# Patient Record
Sex: FEMALE | Race: WHITE | NOT HISPANIC OR LATINO | URBAN - METROPOLITAN AREA
[De-identification: names, ages, dates, MRNs, and addresses within clinical notes are randomized per-mention and may not be internally consistent; named-entity substitution may affect disease eponyms.]

---

## 2020-01-23 ENCOUNTER — EMERGENCY (EMERGENCY)
Facility: HOSPITAL | Age: 85
LOS: 1 days | Discharge: HOME | End: 2020-01-23
Attending: EMERGENCY MEDICINE
Payer: MEDICARE

## 2020-01-23 VITALS
TEMPERATURE: 98 F | SYSTOLIC BLOOD PRESSURE: 177 MMHG | DIASTOLIC BLOOD PRESSURE: 78 MMHG | OXYGEN SATURATION: 96 % | HEART RATE: 80 BPM | RESPIRATION RATE: 18 BRPM

## 2020-01-23 VITALS
HEART RATE: 79 BPM | DIASTOLIC BLOOD PRESSURE: 77 MMHG | TEMPERATURE: 97 F | RESPIRATION RATE: 17 BRPM | OXYGEN SATURATION: 99 % | WEIGHT: 138.01 LBS | HEIGHT: 62 IN | SYSTOLIC BLOOD PRESSURE: 204 MMHG

## 2020-01-23 LAB
ALBUMIN SERPL ELPH-MCNC: 4.1 G/DL — SIGNIFICANT CHANGE UP (ref 3.5–5)
ALP SERPL-CCNC: 108 U/L — SIGNIFICANT CHANGE UP (ref 40–120)
ALT FLD-CCNC: 23 U/L DA — SIGNIFICANT CHANGE UP (ref 10–60)
ANION GAP SERPL CALC-SCNC: 6 MMOL/L — SIGNIFICANT CHANGE UP (ref 5–17)
APPEARANCE UR: CLEAR — SIGNIFICANT CHANGE UP
AST SERPL-CCNC: 24 U/L — SIGNIFICANT CHANGE UP (ref 10–40)
BACTERIA # UR AUTO: ABNORMAL /HPF
BASOPHILS # BLD AUTO: 0.03 K/UL — SIGNIFICANT CHANGE UP (ref 0–0.2)
BASOPHILS NFR BLD AUTO: 0.4 % — SIGNIFICANT CHANGE UP (ref 0–2)
BILIRUB SERPL-MCNC: 0.7 MG/DL — SIGNIFICANT CHANGE UP (ref 0.2–1.2)
BILIRUB UR-MCNC: NEGATIVE — SIGNIFICANT CHANGE UP
BUN SERPL-MCNC: 13 MG/DL — SIGNIFICANT CHANGE UP (ref 7–18)
CALCIUM SERPL-MCNC: 9.4 MG/DL — SIGNIFICANT CHANGE UP (ref 8.4–10.5)
CHLORIDE SERPL-SCNC: 101 MMOL/L — SIGNIFICANT CHANGE UP (ref 96–108)
CO2 SERPL-SCNC: 28 MMOL/L — SIGNIFICANT CHANGE UP (ref 22–31)
COLOR SPEC: YELLOW — SIGNIFICANT CHANGE UP
CREAT SERPL-MCNC: 0.7 MG/DL — SIGNIFICANT CHANGE UP (ref 0.5–1.3)
DIFF PNL FLD: NEGATIVE — SIGNIFICANT CHANGE UP
EOSINOPHIL # BLD AUTO: 0.02 K/UL — SIGNIFICANT CHANGE UP (ref 0–0.5)
EOSINOPHIL NFR BLD AUTO: 0.2 % — SIGNIFICANT CHANGE UP (ref 0–6)
EPI CELLS # UR: ABNORMAL /HPF
GLUCOSE SERPL-MCNC: 110 MG/DL — HIGH (ref 70–99)
GLUCOSE UR QL: NEGATIVE — SIGNIFICANT CHANGE UP
HCT VFR BLD CALC: 41.7 % — SIGNIFICANT CHANGE UP (ref 34.5–45)
HGB BLD-MCNC: 13.7 G/DL — SIGNIFICANT CHANGE UP (ref 11.5–15.5)
IMM GRANULOCYTES NFR BLD AUTO: 0.6 % — SIGNIFICANT CHANGE UP (ref 0–1.5)
KETONES UR-MCNC: NEGATIVE — SIGNIFICANT CHANGE UP
LACTATE SERPL-SCNC: 2 MMOL/L — SIGNIFICANT CHANGE UP (ref 0.7–2)
LEUKOCYTE ESTERASE UR-ACNC: ABNORMAL
LYMPHOCYTES # BLD AUTO: 1.03 K/UL — SIGNIFICANT CHANGE UP (ref 1–3.3)
LYMPHOCYTES # BLD AUTO: 12.1 % — LOW (ref 13–44)
MCHC RBC-ENTMCNC: 28.3 PG — SIGNIFICANT CHANGE UP (ref 27–34)
MCHC RBC-ENTMCNC: 32.9 GM/DL — SIGNIFICANT CHANGE UP (ref 32–36)
MCV RBC AUTO: 86.2 FL — SIGNIFICANT CHANGE UP (ref 80–100)
MONOCYTES # BLD AUTO: 0.45 K/UL — SIGNIFICANT CHANGE UP (ref 0–0.9)
MONOCYTES NFR BLD AUTO: 5.3 % — SIGNIFICANT CHANGE UP (ref 2–14)
NEUTROPHILS # BLD AUTO: 6.93 K/UL — SIGNIFICANT CHANGE UP (ref 1.8–7.4)
NEUTROPHILS NFR BLD AUTO: 81.4 % — HIGH (ref 43–77)
NITRITE UR-MCNC: NEGATIVE — SIGNIFICANT CHANGE UP
NRBC # BLD: 0 /100 WBCS — SIGNIFICANT CHANGE UP (ref 0–0)
PH UR: 8 — SIGNIFICANT CHANGE UP (ref 5–8)
PLATELET # BLD AUTO: 229 K/UL — SIGNIFICANT CHANGE UP (ref 150–400)
POTASSIUM SERPL-MCNC: 3.4 MMOL/L — LOW (ref 3.5–5.3)
POTASSIUM SERPL-SCNC: 3.4 MMOL/L — LOW (ref 3.5–5.3)
PROT SERPL-MCNC: 7.8 G/DL — SIGNIFICANT CHANGE UP (ref 6–8.3)
PROT UR-MCNC: NEGATIVE — SIGNIFICANT CHANGE UP
RBC # BLD: 4.84 M/UL — SIGNIFICANT CHANGE UP (ref 3.8–5.2)
RBC # FLD: 13.2 % — SIGNIFICANT CHANGE UP (ref 10.3–14.5)
RBC CASTS # UR COMP ASSIST: SIGNIFICANT CHANGE UP /HPF (ref 0–2)
SODIUM SERPL-SCNC: 135 MMOL/L — SIGNIFICANT CHANGE UP (ref 135–145)
SP GR SPEC: 1.01 — SIGNIFICANT CHANGE UP (ref 1.01–1.02)
TSH SERPL-MCNC: 4.12 UU/ML — SIGNIFICANT CHANGE UP (ref 0.34–4.82)
UROBILINOGEN FLD QL: NEGATIVE — SIGNIFICANT CHANGE UP
WBC # BLD: 8.51 K/UL — SIGNIFICANT CHANGE UP (ref 3.8–10.5)
WBC # FLD AUTO: 8.51 K/UL — SIGNIFICANT CHANGE UP (ref 3.8–10.5)
WBC UR QL: ABNORMAL /HPF (ref 0–5)

## 2020-01-23 PROCEDURE — 71045 X-RAY EXAM CHEST 1 VIEW: CPT

## 2020-01-23 PROCEDURE — 80053 COMPREHEN METABOLIC PANEL: CPT

## 2020-01-23 PROCEDURE — 85027 COMPLETE CBC AUTOMATED: CPT

## 2020-01-23 PROCEDURE — 93005 ELECTROCARDIOGRAM TRACING: CPT

## 2020-01-23 PROCEDURE — 70450 CT HEAD/BRAIN W/O DYE: CPT

## 2020-01-23 PROCEDURE — 84443 ASSAY THYROID STIM HORMONE: CPT

## 2020-01-23 PROCEDURE — 81001 URINALYSIS AUTO W/SCOPE: CPT

## 2020-01-23 PROCEDURE — 70450 CT HEAD/BRAIN W/O DYE: CPT | Mod: 26

## 2020-01-23 PROCEDURE — 83605 ASSAY OF LACTIC ACID: CPT

## 2020-01-23 PROCEDURE — 96374 THER/PROPH/DIAG INJ IV PUSH: CPT

## 2020-01-23 PROCEDURE — 87086 URINE CULTURE/COLONY COUNT: CPT

## 2020-01-23 PROCEDURE — 87040 BLOOD CULTURE FOR BACTERIA: CPT

## 2020-01-23 PROCEDURE — 99284 EMERGENCY DEPT VISIT MOD MDM: CPT | Mod: 25

## 2020-01-23 PROCEDURE — 84484 ASSAY OF TROPONIN QUANT: CPT

## 2020-01-23 PROCEDURE — 99284 EMERGENCY DEPT VISIT MOD MDM: CPT

## 2020-01-23 PROCEDURE — 36415 COLL VENOUS BLD VENIPUNCTURE: CPT

## 2020-01-23 PROCEDURE — 71045 X-RAY EXAM CHEST 1 VIEW: CPT | Mod: 26

## 2020-01-23 RX ORDER — SODIUM CHLORIDE 9 MG/ML
1000 INJECTION INTRAMUSCULAR; INTRAVENOUS; SUBCUTANEOUS ONCE
Refills: 0 | Status: COMPLETED | OUTPATIENT
Start: 2020-01-23 | End: 2020-01-23

## 2020-01-23 RX ORDER — CEFPODOXIME PROXETIL 100 MG
1 TABLET ORAL
Qty: 20 | Refills: 0
Start: 2020-01-23 | End: 2020-02-01

## 2020-01-23 RX ORDER — CEFTRIAXONE 500 MG/1
1000 INJECTION, POWDER, FOR SOLUTION INTRAMUSCULAR; INTRAVENOUS ONCE
Refills: 0 | Status: COMPLETED | OUTPATIENT
Start: 2020-01-23 | End: 2020-01-23

## 2020-01-23 RX ADMIN — SODIUM CHLORIDE 1000 MILLILITER(S): 9 INJECTION INTRAMUSCULAR; INTRAVENOUS; SUBCUTANEOUS at 17:14

## 2020-01-23 RX ADMIN — CEFTRIAXONE 100 MILLIGRAM(S): 500 INJECTION, POWDER, FOR SOLUTION INTRAMUSCULAR; INTRAVENOUS at 21:01

## 2020-01-23 NOTE — ED PROVIDER NOTE - CLINICAL SUMMARY MEDICAL DECISION MAKING FREE TEXT BOX
87 y/o F patient presents to the ED w/ hypothermia. Given reported Hypothermia will obtain Sepsis workup.

## 2020-01-23 NOTE — ED PROVIDER NOTE - PATIENT PORTAL LINK FT
You can access the FollowMyHealth Patient Portal offered by [facility] by registering at the following website: [email or web address]. By joining [employer]’s FollowMyHealth portal, you will also be able to view your health information using other applications (apps) compatible with our system.

## 2020-01-23 NOTE — ED ADULT NURSE NOTE - OBJECTIVE STATEMENT
Pt came in for c/o N/V, weakness & chills s/p lunch today afternoon. Pt denies fever, chills, abdominal pain, urinary distress, cp, sob, cough. Breathing unlabored. NAD.

## 2020-01-23 NOTE — ED PROVIDER NOTE - OBJECTIVE STATEMENT
y/o M/F patient presents to the ED w/ hypothermia, diarrhea, and vomiting that began today (1/23/2020). Patient reports she felt fine this morning (1/23/2020) 87 y/o F patient, w/ PMHx of HTN and Hypothyroidism, presents to the ED w/ hypothermia, chills, diarrhea, and vomiting that began today (1/23/2020). Patient reports she has had x1 episodes of vomiting and x1 episode of diarrhea today (1/23/2019). Patient reports she felt fine this morning (1/23/2020) and called her son. Patient's son reports patient's temperature was 94 F and states he warmed patient up w/ a hot blanket. Patient's son reports patient's last temperature at home was 95 F. Patient endorses she has been feeling weak since vomiting episode. Patient denies chest pain, trouble breathing, abd pain, dysuria, fever, or any other acute complaints. Allergies: Codeine: Anaphylaxis, Lidocaine: Anaphylaxis.

## 2020-01-25 LAB
CULTURE RESULTS: SIGNIFICANT CHANGE UP
SPECIMEN SOURCE: SIGNIFICANT CHANGE UP

## 2020-01-29 LAB
CULTURE RESULTS: SIGNIFICANT CHANGE UP
CULTURE RESULTS: SIGNIFICANT CHANGE UP
SPECIMEN SOURCE: SIGNIFICANT CHANGE UP
SPECIMEN SOURCE: SIGNIFICANT CHANGE UP

## 2021-10-10 ENCOUNTER — INPATIENT (INPATIENT)
Facility: HOSPITAL | Age: 86
LOS: 4 days | Discharge: NURSING, REHAB OR LONG-TERM CARE | DRG: 556 | End: 2021-10-15
Attending: INTERNAL MEDICINE | Admitting: INTERNAL MEDICINE
Payer: MEDICARE

## 2021-10-10 VITALS
SYSTOLIC BLOOD PRESSURE: 211 MMHG | HEIGHT: 62 IN | HEART RATE: 78 BPM | TEMPERATURE: 98 F | OXYGEN SATURATION: 98 % | RESPIRATION RATE: 18 BRPM | WEIGHT: 142.42 LBS | DIASTOLIC BLOOD PRESSURE: 76 MMHG

## 2021-10-10 DIAGNOSIS — R26.81 UNSTEADINESS ON FEET: ICD-10-CM

## 2021-10-10 LAB
ALBUMIN SERPL ELPH-MCNC: 3.5 G/DL — SIGNIFICANT CHANGE UP (ref 3.5–5)
ALP SERPL-CCNC: 102 U/L — SIGNIFICANT CHANGE UP (ref 40–120)
ALT FLD-CCNC: 26 U/L DA — SIGNIFICANT CHANGE UP (ref 10–60)
ANION GAP SERPL CALC-SCNC: 6 MMOL/L — SIGNIFICANT CHANGE UP (ref 5–17)
APPEARANCE UR: CLEAR — SIGNIFICANT CHANGE UP
AST SERPL-CCNC: 53 U/L — HIGH (ref 10–40)
BACTERIA # UR AUTO: ABNORMAL /HPF
BASOPHILS # BLD AUTO: 0.03 K/UL — SIGNIFICANT CHANGE UP (ref 0–0.2)
BASOPHILS NFR BLD AUTO: 0.5 % — SIGNIFICANT CHANGE UP (ref 0–2)
BILIRUB SERPL-MCNC: 0.8 MG/DL — SIGNIFICANT CHANGE UP (ref 0.2–1.2)
BILIRUB UR-MCNC: NEGATIVE — SIGNIFICANT CHANGE UP
BUN SERPL-MCNC: 12 MG/DL — SIGNIFICANT CHANGE UP (ref 7–18)
CALCIUM SERPL-MCNC: 9.1 MG/DL — SIGNIFICANT CHANGE UP (ref 8.4–10.5)
CHLORIDE SERPL-SCNC: 103 MMOL/L — SIGNIFICANT CHANGE UP (ref 96–108)
CK SERPL-CCNC: 145 U/L — SIGNIFICANT CHANGE UP (ref 21–215)
CO2 SERPL-SCNC: 28 MMOL/L — SIGNIFICANT CHANGE UP (ref 22–31)
COLOR SPEC: YELLOW — SIGNIFICANT CHANGE UP
COMMENT - URINE: SIGNIFICANT CHANGE UP
CREAT SERPL-MCNC: 0.63 MG/DL — SIGNIFICANT CHANGE UP (ref 0.5–1.3)
DIFF PNL FLD: NEGATIVE — SIGNIFICANT CHANGE UP
EOSINOPHIL # BLD AUTO: 0.04 K/UL — SIGNIFICANT CHANGE UP (ref 0–0.5)
EOSINOPHIL NFR BLD AUTO: 0.7 % — SIGNIFICANT CHANGE UP (ref 0–6)
EPI CELLS # UR: SIGNIFICANT CHANGE UP /HPF
GLUCOSE SERPL-MCNC: 93 MG/DL — SIGNIFICANT CHANGE UP (ref 70–99)
GLUCOSE UR QL: NEGATIVE — SIGNIFICANT CHANGE UP
HCT VFR BLD CALC: 39.6 % — SIGNIFICANT CHANGE UP (ref 34.5–45)
HGB BLD-MCNC: 13.2 G/DL — SIGNIFICANT CHANGE UP (ref 11.5–15.5)
IMM GRANULOCYTES NFR BLD AUTO: 0.3 % — SIGNIFICANT CHANGE UP (ref 0–1.5)
KETONES UR-MCNC: NEGATIVE — SIGNIFICANT CHANGE UP
LEUKOCYTE ESTERASE UR-ACNC: ABNORMAL
LYMPHOCYTES # BLD AUTO: 1.57 K/UL — SIGNIFICANT CHANGE UP (ref 1–3.3)
LYMPHOCYTES # BLD AUTO: 26.1 % — SIGNIFICANT CHANGE UP (ref 13–44)
MAGNESIUM SERPL-MCNC: 2.1 MG/DL — SIGNIFICANT CHANGE UP (ref 1.6–2.6)
MCHC RBC-ENTMCNC: 28.4 PG — SIGNIFICANT CHANGE UP (ref 27–34)
MCHC RBC-ENTMCNC: 33.3 GM/DL — SIGNIFICANT CHANGE UP (ref 32–36)
MCV RBC AUTO: 85.2 FL — SIGNIFICANT CHANGE UP (ref 80–100)
MONOCYTES # BLD AUTO: 0.5 K/UL — SIGNIFICANT CHANGE UP (ref 0–0.9)
MONOCYTES NFR BLD AUTO: 8.3 % — SIGNIFICANT CHANGE UP (ref 2–14)
NEUTROPHILS # BLD AUTO: 3.86 K/UL — SIGNIFICANT CHANGE UP (ref 1.8–7.4)
NEUTROPHILS NFR BLD AUTO: 64.1 % — SIGNIFICANT CHANGE UP (ref 43–77)
NITRITE UR-MCNC: NEGATIVE — SIGNIFICANT CHANGE UP
NRBC # BLD: 0 /100 WBCS — SIGNIFICANT CHANGE UP (ref 0–0)
PH UR: 8 — SIGNIFICANT CHANGE UP (ref 5–8)
PLATELET # BLD AUTO: 229 K/UL — SIGNIFICANT CHANGE UP (ref 150–400)
POTASSIUM SERPL-MCNC: 4.6 MMOL/L — SIGNIFICANT CHANGE UP (ref 3.5–5.3)
POTASSIUM SERPL-SCNC: 4.6 MMOL/L — SIGNIFICANT CHANGE UP (ref 3.5–5.3)
PROT SERPL-MCNC: 7.6 G/DL — SIGNIFICANT CHANGE UP (ref 6–8.3)
PROT UR-MCNC: NEGATIVE — SIGNIFICANT CHANGE UP
RBC # BLD: 4.65 M/UL — SIGNIFICANT CHANGE UP (ref 3.8–5.2)
RBC # FLD: 13.5 % — SIGNIFICANT CHANGE UP (ref 10.3–14.5)
RBC CASTS # UR COMP ASSIST: NEGATIVE /HPF — SIGNIFICANT CHANGE UP (ref 0–2)
SARS-COV-2 RNA SPEC QL NAA+PROBE: SIGNIFICANT CHANGE UP
SODIUM SERPL-SCNC: 137 MMOL/L — SIGNIFICANT CHANGE UP (ref 135–145)
SP GR SPEC: 1.01 — SIGNIFICANT CHANGE UP (ref 1.01–1.02)
TSH SERPL-MCNC: 5.23 UU/ML — HIGH (ref 0.34–4.82)
UROBILINOGEN FLD QL: NEGATIVE — SIGNIFICANT CHANGE UP
WBC # BLD: 6.02 K/UL — SIGNIFICANT CHANGE UP (ref 3.8–10.5)
WBC # FLD AUTO: 6.02 K/UL — SIGNIFICANT CHANGE UP (ref 3.8–10.5)
WBC UR QL: SIGNIFICANT CHANGE UP /HPF (ref 0–5)

## 2021-10-10 PROCEDURE — 99285 EMERGENCY DEPT VISIT HI MDM: CPT

## 2021-10-10 PROCEDURE — 93010 ELECTROCARDIOGRAM REPORT: CPT

## 2021-10-10 PROCEDURE — 70450 CT HEAD/BRAIN W/O DYE: CPT | Mod: 26,MA

## 2021-10-10 RX ORDER — SODIUM CHLORIDE 9 MG/ML
500 INJECTION INTRAMUSCULAR; INTRAVENOUS; SUBCUTANEOUS ONCE
Refills: 0 | Status: COMPLETED | OUTPATIENT
Start: 2021-10-10 | End: 2021-10-10

## 2021-10-10 RX ADMIN — SODIUM CHLORIDE 500 MILLILITER(S): 9 INJECTION INTRAMUSCULAR; INTRAVENOUS; SUBCUTANEOUS at 21:09

## 2021-10-10 NOTE — ED ADULT NURSE NOTE - OBJECTIVE STATEMENT
Pt presents to ED with c/o difficulty ambulating and weakness/pain in the lower extremities Pt presents to ED with c/o difficulty ambulating and weakness/pain in the lower extremities. Stage 1 pressure ulcer noted in mid upper back.

## 2021-10-10 NOTE — ED PROVIDER NOTE - OBJECTIVE STATEMENT
89F, pmh of htn, hypothyroidism, presenting with difficulty walking. patient reports she attempted to get up after speaking on the phone when her legs suddenly felt weak so she had to sit down immediately. attempted to walk two more times, neighbors came over, patient ate food, but was never able to walk. denies falling to day, but had similar symptoms in the past, most recently two weeks ago when she did fall, and then again one week ago without a fall. no headache, dizziness, nausea, vomiting, chest pain, shortness of breath, pain or burning with urination, pain or swelling of lower extremities.

## 2021-10-10 NOTE — ED PROVIDER NOTE - NSFOLLOWUPINSTRUCTIONS_ED_ALL_ED_FT
You were seen in the emergency department for leg weakness.     Please follow-up with your primary care doctor in the next 24-48 hours.     If you have any worsening symptoms severe headache, chest pain, shortness of breath, or weakness on one side of your body, please return to the emergency department.

## 2021-10-10 NOTE — ED ADULT NURSE NOTE - NS ED NURSE RECORD ANOTHER HT AND WT
Azeb sent by Dr. Sanchez for r/o statin induced myopathy. Patient c/o arm, back, and leg pain s/p starting atorvastatin. A&Ox4.   
Yes

## 2021-10-10 NOTE — ED PROVIDER NOTE - PROGRESS NOTE DETAILS
patient and son updated on all results. sympotms improved and able to walk with two person assist, but typically does not need assistnace to walk, lives on second patient and son updated on all results. symptoms improved and able to walk with two person assist, but typically does not need assistance to walk, bedroom on second floor. patient does not feel safe or comfortable to go home. feels she will fall. patient was unsteady with ambulation in ED so agree discharge is likely unsafe at this time. will admit. Isidro Nunez

## 2021-10-10 NOTE — ED PROVIDER NOTE - CLINICAL SUMMARY MEDICAL DECISION MAKING FREE TEXT BOX
89F presenting with leg weakness. non-focal neuro exam. lives alone, typically able to ambulate without assistance. well get labs, CT head. will reassess safety of discharge.

## 2021-10-10 NOTE — ED ADULT NURSE NOTE - INTERVENTIONS DEFINITIONS
Call bell, personal items and telephone within reach/Instruct patient to call for assistance/Room bathroom lighting operational/Non-slip footwear when patient is off stretcher/Physically safe environment: no spills, clutter or unnecessary equipment/Stretcher in lowest position, wheels locked, appropriate side rails in place/Monitor gait and stability/Reinforce activity limits and safety measures with patient and family/Provide visual clues: red socks

## 2021-10-10 NOTE — ED PROVIDER NOTE - PHYSICAL EXAMINATION
General: well appearing female, no acute distress   HEENT: normocephalic, atraumatic, PERRL   Respiratory: normal work of breathing, lungs clear to auscultation bilaterally   Cardiac: regular rate and rhythm   Abdomen: soft, non-tender, no guarding or rebound   MSK: no swelling or tenderness of lower extremities, moving all extremities spontaneously   Skin: warm, dry   Neuro: A&Ox3, no cranial nerves II-xII intact, 5/5 strength in all extremities, no sensory deficits   Psych: appropriate affect

## 2021-10-10 NOTE — ED ADULT NURSE NOTE - ED STAT RN HANDOFF DETAILS 2
[facility] Patient denies pain/discomfort.

## 2021-10-11 DIAGNOSIS — F41.9 ANXIETY DISORDER, UNSPECIFIED: ICD-10-CM

## 2021-10-11 DIAGNOSIS — Z29.9 ENCOUNTER FOR PROPHYLACTIC MEASURES, UNSPECIFIED: ICD-10-CM

## 2021-10-11 DIAGNOSIS — R29.898 OTHER SYMPTOMS AND SIGNS INVOLVING THE MUSCULOSKELETAL SYSTEM: ICD-10-CM

## 2021-10-11 DIAGNOSIS — E03.9 HYPOTHYROIDISM, UNSPECIFIED: ICD-10-CM

## 2021-10-11 DIAGNOSIS — I16.0 HYPERTENSIVE URGENCY: ICD-10-CM

## 2021-10-11 PROBLEM — Z78.9 OTHER SPECIFIED HEALTH STATUS: Chronic | Status: ACTIVE | Noted: 2020-01-23

## 2021-10-11 LAB
A1C WITH ESTIMATED AVERAGE GLUCOSE RESULT: 4.9 % — SIGNIFICANT CHANGE UP (ref 4–5.6)
ALBUMIN SERPL ELPH-MCNC: 3.3 G/DL — LOW (ref 3.5–5)
ALP SERPL-CCNC: 91 U/L — SIGNIFICANT CHANGE UP (ref 40–120)
ALT FLD-CCNC: 19 U/L DA — SIGNIFICANT CHANGE UP (ref 10–60)
ANION GAP SERPL CALC-SCNC: 5 MMOL/L — SIGNIFICANT CHANGE UP (ref 5–17)
AST SERPL-CCNC: 20 U/L — SIGNIFICANT CHANGE UP (ref 10–40)
BASOPHILS # BLD AUTO: 0.03 K/UL — SIGNIFICANT CHANGE UP (ref 0–0.2)
BASOPHILS NFR BLD AUTO: 0.4 % — SIGNIFICANT CHANGE UP (ref 0–2)
BILIRUB SERPL-MCNC: 0.9 MG/DL — SIGNIFICANT CHANGE UP (ref 0.2–1.2)
BUN SERPL-MCNC: 8 MG/DL — SIGNIFICANT CHANGE UP (ref 7–18)
CALCIUM SERPL-MCNC: 9 MG/DL — SIGNIFICANT CHANGE UP (ref 8.4–10.5)
CHLORIDE SERPL-SCNC: 105 MMOL/L — SIGNIFICANT CHANGE UP (ref 96–108)
CHOLEST SERPL-MCNC: 179 MG/DL — SIGNIFICANT CHANGE UP
CO2 SERPL-SCNC: 29 MMOL/L — SIGNIFICANT CHANGE UP (ref 22–31)
CREAT SERPL-MCNC: 0.58 MG/DL — SIGNIFICANT CHANGE UP (ref 0.5–1.3)
EOSINOPHIL # BLD AUTO: 0.05 K/UL — SIGNIFICANT CHANGE UP (ref 0–0.5)
EOSINOPHIL NFR BLD AUTO: 0.7 % — SIGNIFICANT CHANGE UP (ref 0–6)
ESTIMATED AVERAGE GLUCOSE: 94 MG/DL — SIGNIFICANT CHANGE UP (ref 68–114)
GLUCOSE SERPL-MCNC: 106 MG/DL — HIGH (ref 70–99)
HCT VFR BLD CALC: 40.1 % — SIGNIFICANT CHANGE UP (ref 34.5–45)
HCYS SERPL-MCNC: 6.7 UMOL/L — SIGNIFICANT CHANGE UP
HDLC SERPL-MCNC: 77 MG/DL — SIGNIFICANT CHANGE UP
HGB BLD-MCNC: 13.3 G/DL — SIGNIFICANT CHANGE UP (ref 11.5–15.5)
IMM GRANULOCYTES NFR BLD AUTO: 0.3 % — SIGNIFICANT CHANGE UP (ref 0–1.5)
LIPID PNL WITH DIRECT LDL SERPL: 87 MG/DL — SIGNIFICANT CHANGE UP
LYMPHOCYTES # BLD AUTO: 1.6 K/UL — SIGNIFICANT CHANGE UP (ref 1–3.3)
LYMPHOCYTES # BLD AUTO: 24 % — SIGNIFICANT CHANGE UP (ref 13–44)
MAGNESIUM SERPL-MCNC: 2 MG/DL — SIGNIFICANT CHANGE UP (ref 1.6–2.6)
MCHC RBC-ENTMCNC: 28.2 PG — SIGNIFICANT CHANGE UP (ref 27–34)
MCHC RBC-ENTMCNC: 33.2 GM/DL — SIGNIFICANT CHANGE UP (ref 32–36)
MCV RBC AUTO: 85 FL — SIGNIFICANT CHANGE UP (ref 80–100)
MONOCYTES # BLD AUTO: 0.59 K/UL — SIGNIFICANT CHANGE UP (ref 0–0.9)
MONOCYTES NFR BLD AUTO: 8.8 % — SIGNIFICANT CHANGE UP (ref 2–14)
NEUTROPHILS # BLD AUTO: 4.38 K/UL — SIGNIFICANT CHANGE UP (ref 1.8–7.4)
NEUTROPHILS NFR BLD AUTO: 65.8 % — SIGNIFICANT CHANGE UP (ref 43–77)
NON HDL CHOLESTEROL: 102 MG/DL — SIGNIFICANT CHANGE UP
NRBC # BLD: 0 /100 WBCS — SIGNIFICANT CHANGE UP (ref 0–0)
PHOSPHATE SERPL-MCNC: 3 MG/DL — SIGNIFICANT CHANGE UP (ref 2.5–4.5)
PLATELET # BLD AUTO: 224 K/UL — SIGNIFICANT CHANGE UP (ref 150–400)
POTASSIUM SERPL-MCNC: 4.1 MMOL/L — SIGNIFICANT CHANGE UP (ref 3.5–5.3)
POTASSIUM SERPL-SCNC: 4.1 MMOL/L — SIGNIFICANT CHANGE UP (ref 3.5–5.3)
PROT SERPL-MCNC: 7.1 G/DL — SIGNIFICANT CHANGE UP (ref 6–8.3)
RBC # BLD: 4.72 M/UL — SIGNIFICANT CHANGE UP (ref 3.8–5.2)
RBC # FLD: 13.4 % — SIGNIFICANT CHANGE UP (ref 10.3–14.5)
SODIUM SERPL-SCNC: 139 MMOL/L — SIGNIFICANT CHANGE UP (ref 135–145)
T PALLIDUM AB TITR SER: NEGATIVE — SIGNIFICANT CHANGE UP
T4 FREE SERPL-MCNC: 1.3 NG/DL — SIGNIFICANT CHANGE UP (ref 0.9–1.8)
TRIGL SERPL-MCNC: 74 MG/DL — SIGNIFICANT CHANGE UP
TSH SERPL-MCNC: 5.41 UU/ML — HIGH (ref 0.34–4.82)
VIT B12 SERPL-MCNC: 864 PG/ML — SIGNIFICANT CHANGE UP (ref 232–1245)
WBC # BLD: 6.67 K/UL — SIGNIFICANT CHANGE UP (ref 3.8–10.5)
WBC # FLD AUTO: 6.67 K/UL — SIGNIFICANT CHANGE UP (ref 3.8–10.5)

## 2021-10-11 PROCEDURE — 99223 1ST HOSP IP/OBS HIGH 75: CPT

## 2021-10-11 RX ORDER — ASPIRIN/CALCIUM CARB/MAGNESIUM 324 MG
81 TABLET ORAL DAILY
Refills: 0 | Status: DISCONTINUED | OUTPATIENT
Start: 2021-10-11 | End: 2021-10-15

## 2021-10-11 RX ORDER — HYDRALAZINE HCL 50 MG
20 TABLET ORAL ONCE
Refills: 0 | Status: COMPLETED | OUTPATIENT
Start: 2021-10-11 | End: 2021-10-11

## 2021-10-11 RX ORDER — HYDROCHLOROTHIAZIDE 25 MG
12.5 TABLET ORAL DAILY
Refills: 0 | Status: DISCONTINUED | OUTPATIENT
Start: 2021-10-11 | End: 2021-10-11

## 2021-10-11 RX ORDER — VALSARTAN 80 MG/1
320 TABLET ORAL DAILY
Refills: 0 | Status: DISCONTINUED | OUTPATIENT
Start: 2021-10-11 | End: 2021-10-15

## 2021-10-11 RX ORDER — ENOXAPARIN SODIUM 100 MG/ML
40 INJECTION SUBCUTANEOUS DAILY
Refills: 0 | Status: DISCONTINUED | OUTPATIENT
Start: 2021-10-11 | End: 2021-10-15

## 2021-10-11 RX ORDER — HYDROCHLOROTHIAZIDE 25 MG
12.5 TABLET ORAL DAILY
Refills: 0 | Status: DISCONTINUED | OUTPATIENT
Start: 2021-10-11 | End: 2021-10-15

## 2021-10-11 RX ORDER — VALSARTAN 80 MG/1
320 TABLET ORAL DAILY
Refills: 0 | Status: DISCONTINUED | OUTPATIENT
Start: 2021-10-11 | End: 2021-10-11

## 2021-10-11 RX ORDER — LEVOTHYROXINE SODIUM 125 MCG
25 TABLET ORAL DAILY
Refills: 0 | Status: DISCONTINUED | OUTPATIENT
Start: 2021-10-11 | End: 2021-10-15

## 2021-10-11 RX ADMIN — Medication 10 MILLIGRAM(S): at 16:07

## 2021-10-11 RX ADMIN — Medication 12.5 MILLIGRAM(S): at 05:40

## 2021-10-11 RX ADMIN — Medication 25 MICROGRAM(S): at 05:39

## 2021-10-11 RX ADMIN — ENOXAPARIN SODIUM 40 MILLIGRAM(S): 100 INJECTION SUBCUTANEOUS at 12:22

## 2021-10-11 RX ADMIN — Medication 10 MILLIGRAM(S): at 22:10

## 2021-10-11 RX ADMIN — Medication 10 MILLIGRAM(S): at 05:39

## 2021-10-11 RX ADMIN — Medication 81 MILLIGRAM(S): at 12:22

## 2021-10-11 RX ADMIN — VALSARTAN 320 MILLIGRAM(S): 80 TABLET ORAL at 05:39

## 2021-10-11 NOTE — PATIENT PROFILE ADULT - HEALTH LITERACY
Echo with severely depressed LV function with EF 25% with severe MR; aggressive diuresis since admission with moderate response with 2.2 liters out with IV Diamox and IV Bumex; orthopnea and LE edema persist; on IV Dobutamine drip @5mcg/kg/min since last week and will initiate wean today with decrease to 2.5mcg/kg/min; will repeat IV Diamox and Bumex today; strict I&Os and daily weights; will likely continue low dose Dobutamine for now given plan for possible GI procedure   no

## 2021-10-11 NOTE — H&P ADULT - CONVERSATION DETAILS
- patient initially requested DNR/DNI  - would like to discuss it more with her sons before she signs any forms

## 2021-10-11 NOTE — H&P ADULT - PROBLEM SELECTOR PLAN 1
Presented w/ sudden onset of b/l LE weakness  has difficulty standing and ambulating  baseline: ambulates independently  has 2 episodes of near fall/ loss of balance past 2 weeks  Sx improved since admission  CBC, BMP, UA - unremarkable  CT Head - neg for ischemia, hemorrhage  fall precaution  f/u PT consult Presented w/ sudden onset of b/l LE weakness  has difficulty standing and ambulating  baseline: ambulates independently  has 2 episodes of near fall/ loss of balance past 2 weeks  Sx improved since admission  CBC, BMP, UA - unremarkable  CT Head - neg for ischemia, hemorrhage  fall precaution  f/u Vit. B12 level  f/u PT consult Presented w/ sudden onset of b/l LE weakness  has difficulty standing and ambulating  baseline: ambulates independently  has 2 episodes of near fall/ loss of balance past 2 weeks  Sx improved since admission  CBC, BMP, UA - unremarkable  CT Head - neg for ischemia, hemorrhage  fall precaution  f/u Vit. B12 level  f/u Neuro consult (Dr. Gerard)  f/u PT consult

## 2021-10-11 NOTE — CHART NOTE - NSCHARTNOTEFT_GEN_A_CORE
Pt was approached to complete the MRI screening form with pt's son Elieser at bedside, pt adamantly declined stating "I am severely claustrophobic", options of pre medicating were discussed which were refused by pt. Pt was educated on risks and benefits with good understanding.

## 2021-10-11 NOTE — CONSULT NOTE ADULT - SUBJECTIVE AND OBJECTIVE BOX
NEUROLOGY CONSULT NOTE    NAME:  NASREEN SANTIAGO      ASSESSMENT:  89 RHF with recurrent episode of b/l lower extremity weakness, which may represent a posterior circulation transient ischemic attack or may reflect a form of cervical radiculopathy given the presence of neck pain or peripheral neuropathy      RECOMMENDATIONS:    - Patient has refused MRI of the brain or spine due to claustrophobia. Given this, a repeat CT Head may be checked to evaluate for evolution of a subacute posterior circulation stroke    - CT Cervical and Lumbar Spine may be checked to evaluate for spinal abnormalities that may be contributing to the patient's symptoms    - Please check folate and vitamin D levels to evaluate for potential contributing factors to the patient's symptoms    - PT/OT evaluation to help with recovery of b/l leg strength and gait    - If symptoms persist for at least 2 weeks, the patient may be considered for an outpatient EMG/NCV to evaluate for peripheral neuropathy    - Continue home aspirin 81mg daily, which can help lower the risk for stroke    - DVT ppx: SCDs, Enoxaparin        *******************************      CHIEF COMPLAINT:  Patient is a 89y old  Female who presents with a chief complaint of lower extremity weakness (11 Oct 2021 08:28)      HPI:  Patient is a 89 y/o F w/ PMHx of HTN, Hypothyroidism and Anxiety. She lives alone, no HHA and walks independently. She was brought by her neighbors to the E.D. due to sudden onset of bilateral leg weakness and inability to ambulate after speaking to someone in the phone. She has been having lower extremity weakness for the past 2 weeks w/ 2 episodes of losing balance and nearly falling. She denies any headache, dizziness, lightheadedness, blurring of vision, chest pain, SOB, palpitation, abdominal pain, diarrhea, constipation nor dysuria. She also denies any head trauma or fall. In the ED her BP was elevated at 211/76 HR - 76. There was slow improvement of her bilateral leg weakness. She was able to move both her lower extremities. There was also no numbness or loss of sensation. She was subsequently admitted for further work-up. (11 Oct 2021 02:15)      NEURO HPI:  89 RHF who reports feeling normal for most of the day yesterday, then at around 5:00 pm, suddenly felt that her legs were weak and that she was going to fall. She sat down before she could fall, but then felt too weak to get up. She denies any associated lightheadedness, dizziness, numbness, headache, or back pain, although she acknowledges having intermittnet neck pain, especially when she lies down in the "wrong position." She had to be brought to the ED after a neighbor brought a wheelchair over to help transport her. While in the hospital, her leg strength has improved a little, but it does not feel like it is back to normal. She recounts that in 2005, she had another episode lasting a day in which her legs became weak, but she did not seek medical attention immediately. She followed with her PCP, who arranged for a CT Head and some blood tests, but she states that she was told that all of these results were normal.      PAST MEDICAL & SURGICAL HISTORY:  No pertinent past medical history  HTN (hypertension)  HLD (hyperlipidemia)  Anxiety  No significant past surgical history      MEDICATIONS:  aspirin  chewable 81 milliGRAM(s) Oral daily  busPIRone 10 milliGRAM(s) Oral three times a day  enoxaparin Injectable 40 milliGRAM(s) SubCutaneous daily  hydrochlorothiazide 12.5 milliGRAM(s) Oral daily  levothyroxine 25 MICROGram(s) Oral daily  valsartan 320 milliGRAM(s) Oral daily      ALLERGIES:  codeine (Anaphylaxis)  lidocaine (Anaphylaxis)      FAMILY HISTORY:  Family history of diabetes mellitus (DM) (Father)  FH: pancreatic cancer (Mother)      SOCIAL HISTORY:  Denies alcohol, tobacco, or illicit drug use  Acknowledgessecondhand smoke exposure from her  in her young adulthood      REVIEW OF SYSTEMS:  GENERAL: No fever, weight changes, fatigue  EYES: No eye pain or discharge  EAR/NOSE/MOUTH/THROAT: No sinus or throat pain; No difficulty hearing  NECK: No pain or stiffness  RESPIRATORY: No cough, wheezing, chills, or hemoptysis  CARDIOVASCULAR: No chest pain, palpitations, shortness of breath, or dyspnea on exertion  GASTROINTESTINAL: No abdominal pain, nausea, vomiting, hematemesis, diarrhea, or constipation  GENITOURINARY: No dysuria, frequency, hematuria, or incontinence  SKIN: No rashes or lesions  ENDOCRINE: No heat or cold intolerance  HEMATOLOGIC: No easy bruising or bleeding  PSYCHIATRIC: No depression, anxiety, or mood swings  MUSCULOSKELETAL: Bilateral leg weakness as in HPI  NEUROLOGICAL: As per HPI        OBJECTIVE:    Vital Signs Last 24 Hrs  T(C): 36.7 (11 Oct 2021 20:19), Max: 36.8 (11 Oct 2021 11:15)  T(F): 98.1 (11 Oct 2021 20:19), Max: 98.3 (11 Oct 2021 15:33)  HR: 92 (11 Oct 2021 20:19) (73 - 92)  BP: 130/84 (11 Oct 2021 20:19) (130/84 - 190/70)  RR: 18 (11 Oct 2021 20:19) (18 - 18)  SpO2: 92% (11 Oct 2021 20:19) (92% - 99%)    General Examination:  General: No acute distress  HEENT: Atraumatic, Normocephalic  Respiratory: CTA B/l.  No crackles, rhonchi, or wheezes.  Cardiovascular: RRR.  Normal S1 & S2.  Weak b/l radial and pedal pulses.    Neurological Examination:  General / Mental Status: AAO x 3.  No aphasia or dysarthria.  Naming and repetition intact.  Immediate and 5-minute delayed recall: 3/3.  Cranial Nerves: VFF x 4.  PERRL.  EOMI x 2, No nystagmus or diplopia.  B/l V1-V3 equal and intact to light touch and pinprick.  Symmetric facial movement and palate elevation.  B/l hearing equal to finger rub.  5/5 strength with b/l sternocleidomastoid & trapezius.  Midline tongue protrusion, with no atrophy or fasciculations.  Motor: Normal bulk & tone in all four extremities.  5/5 strength throughout b/l upper extremities, without downward drift, and with full b/l hand  strength.  At least 3/5 throughout b/l lower extremities, with downward drift in both, limited by pain at b/l anterior thighs.  No rigidity, spasticity, or tremors in any of the four extremities.  Sensory: Intact to light touch and pinprick in all four extremities.  Reflex: 1+ and symmetric at b/l biceps, triceps, brachioradialis, patellae, and ankles.  Mute toes b/l.  Coordination: No dysmetria with b/l finger-to-nose and heel raise tests.  Symmetric, but slow,  alternating movements b/l.  Gait and Romberg sign testing deferred due to b/l lower extremity weakness.        LABORATORY VALUES:                        13.3   6.67  )-----------( 224      ( 11 Oct 2021 05:17 )             40.1       10-11    139  |  105  |  8   ----------------------------<  106<H>  4.1   |  29  |  0.58    Ca    9.0      11 Oct 2021 05:17  Phos  3.0     10-11  Mg     2.0     10-11    TPro  7.1  /  Alb  3.3<L>  /  TBili  0.9  /  DBili  x   /  AST  20  /  ALT  19  /  AlkPhos  91  10-11    10-11 Chol 179 LDL 87 HDL 77 Trig 74    Vitamin B12, Serum: 864 pg/mL (10-11-21 @ 09:07)    A1C with Estimated Average Glucose (10.11.21 @ 09:07)   A1C with Estimated Average Glucose Result: 4.9%   Estimated Average Glucose: 94 mg/dL     Thyroid Stimulating Hormone, Serum: 5.41 uU/mL (10.11.21 @ 05:17)   Free Thyroxine, Serum: 1.3 ng/dL (10.11.21 @ 09:07)     Creatine Kinase, Serum: 145 U/L (10.10.21 @ 21:00)         NEUROIMAGING:      CT Head (10/10/21):  - No acute intracranial abnormality  - Chronic microvascular changes            Please contact the Neurology consult service with any neurological questions.    James Sheffield MD   of Neurology  [facility] NEUROLOGY CONSULT NOTE    NAME:  NASREEN SANTIAGO      ASSESSMENT:  89 RHF with recurrent episode of b/l lower extremity weakness, which may represent a posterior circulation transient ischemic attack or may reflect a form of cervical radiculopathy given the presence of neck pain or peripheral neuropathy      RECOMMENDATIONS:    - Patient has refused MRI of the brain or spine due to claustrophobia. Given this, a repeat CT Head may be checked to evaluate for evolution of a subacute posterior circulation stroke    - CT Cervical and Lumbar Spine may be checked to evaluate for spinal abnormalities that may be contributing to the patient's symptoms    - Please check folate and vitamin D levels to evaluate for potential contributing factors to the patient's symptoms    - PT/OT evaluation to help with recovery of b/l leg strength and gait    - If symptoms persist for at least 2 weeks, the patient may be considered for an outpatient EMG/NCV to evaluate for peripheral neuropathy    - Continue home aspirin 81mg daily, which can help lower the risk for stroke    - DVT ppx: SCDs, Enoxaparin        *******************************      CHIEF COMPLAINT:  Patient is a 89y old  Female who presents with a chief complaint of lower extremity weakness (11 Oct 2021 08:28)      HPI:  Patient is a 89 y/o F w/ PMHx of HTN, Hypothyroidism and Anxiety. She lives alone, no HHA and walks independently. She was brought by her neighbors to the E.D. due to sudden onset of bilateral leg weakness and inability to ambulate after speaking to someone in the phone. She has been having lower extremity weakness for the past 2 weeks w/ 2 episodes of losing balance and nearly falling. She denies any headache, dizziness, lightheadedness, blurring of vision, chest pain, SOB, palpitation, abdominal pain, diarrhea, constipation nor dysuria. She also denies any head trauma or fall. In the ED her BP was elevated at 211/76 HR - 76. There was slow improvement of her bilateral leg weakness. She was able to move both her lower extremities. There was also no numbness or loss of sensation. She was subsequently admitted for further work-up. (11 Oct 2021 02:15)      NEURO HPI:  89 RHF who reports feeling normal for most of the day yesterday, then at around 5:00 pm, suddenly felt that her legs were weak and that she was going to fall. She sat down before she could fall, but then felt too weak to get up. She denies any associated lightheadedness, dizziness, numbness, headache, or back pain, although she acknowledges having intermittnet neck pain, especially when she lies down in the "wrong position." She had to be brought to the ED after a neighbor brought a wheelchair over to help transport her. While in the hospital, her leg strength has improved a little, but it does not feel like it is back to normal. She recounts that in 2005, she had another episode lasting a day in which her legs became weak, but she did not seek medical attention immediately. She followed with her PCP, who arranged for a CT Head and some blood tests, but she states that she was told that all of these results were normal.      PAST MEDICAL & SURGICAL HISTORY:  No pertinent past medical history  HTN (hypertension)  HLD (hyperlipidemia)  Anxiety  No significant past surgical history      MEDICATIONS:  aspirin  chewable 81 milliGRAM(s) Oral daily  busPIRone 10 milliGRAM(s) Oral three times a day  enoxaparin Injectable 40 milliGRAM(s) SubCutaneous daily  hydrochlorothiazide 12.5 milliGRAM(s) Oral daily  levothyroxine 25 MICROGram(s) Oral daily  valsartan 320 milliGRAM(s) Oral daily      ALLERGIES:  codeine (Anaphylaxis)  lidocaine (Anaphylaxis)      FAMILY HISTORY:  Diabetes mellitus (DM) (Father)  CABG & Post-CABG stroke (Father)  Pancreatic cancer (Mother)      SOCIAL HISTORY:  Denies alcohol, tobacco, or illicit drug use  Acknowledges secondhand smoke exposure from her  in her young adulthood      REVIEW OF SYSTEMS:  GENERAL: No fever, weight changes, fatigue  EYES: No eye pain or discharge  EAR/NOSE/MOUTH/THROAT: No sinus or throat pain; No difficulty hearing  NECK: No pain or stiffness  RESPIRATORY: No cough, wheezing, chills, or hemoptysis  CARDIOVASCULAR: No chest pain, palpitations, shortness of breath, or dyspnea on exertion  GASTROINTESTINAL: No abdominal pain, nausea, vomiting, hematemesis, diarrhea, or constipation  GENITOURINARY: No dysuria, frequency, hematuria, or incontinence  SKIN: No rashes or lesions  ENDOCRINE: No heat or cold intolerance  HEMATOLOGIC: No easy bruising or bleeding  PSYCHIATRIC: No depression, anxiety, or mood swings  MUSCULOSKELETAL: Bilateral leg weakness as in HPI  NEUROLOGICAL: As per HPI        OBJECTIVE:    Vital Signs Last 24 Hrs  T(C): 36.7 (11 Oct 2021 20:19), Max: 36.8 (11 Oct 2021 11:15)  T(F): 98.1 (11 Oct 2021 20:19), Max: 98.3 (11 Oct 2021 15:33)  HR: 92 (11 Oct 2021 20:19) (73 - 92)  BP: 130/84 (11 Oct 2021 20:19) (130/84 - 190/70)  RR: 18 (11 Oct 2021 20:19) (18 - 18)  SpO2: 92% (11 Oct 2021 20:19) (92% - 99%)    General Examination:  General: No acute distress  HEENT: Atraumatic, Normocephalic  Respiratory: CTA B/l.  No crackles, rhonchi, or wheezes.  Cardiovascular: RRR.  Normal S1 & S2.  Weak b/l radial and pedal pulses.    Neurological Examination:  General / Mental Status: AAO x 3.  No aphasia or dysarthria.  Naming and repetition intact.  Immediate and 5-minute delayed recall: 3/3.  Cranial Nerves: VFF x 4.  PERRL.  EOMI x 2, No nystagmus or diplopia.  B/l V1-V3 equal and intact to light touch and pinprick.  Symmetric facial movement and palate elevation.  B/l hearing equal to finger rub.  5/5 strength with b/l sternocleidomastoid & trapezius.  Midline tongue protrusion, with no atrophy or fasciculations.  Motor: Normal bulk & tone in all four extremities.  5/5 strength throughout b/l upper extremities, without downward drift, and with full b/l hand  strength.  At least 3/5 throughout b/l lower extremities, with downward drift in both, limited by pain at b/l anterior thighs.  No rigidity, spasticity, or tremors in any of the four extremities.  Sensory: Intact to light touch and pinprick in all four extremities.  Reflex: 1+ and symmetric at b/l biceps, triceps, brachioradialis, patellae, and ankles.  Mute toes b/l.  Coordination: No dysmetria with b/l finger-to-nose and heel raise tests.  Symmetric, but slow,  alternating movements b/l.  Gait and Romberg sign testing deferred due to b/l lower extremity weakness.        LABORATORY VALUES:                        13.3   6.67  )-----------( 224      ( 11 Oct 2021 05:17 )             40.1       10-11    139  |  105  |  8   ----------------------------<  106<H>  4.1   |  29  |  0.58    Ca    9.0      11 Oct 2021 05:17  Phos  3.0     10-11  Mg     2.0     10-11    TPro  7.1  /  Alb  3.3<L>  /  TBili  0.9  /  DBili  x   /  AST  20  /  ALT  19  /  AlkPhos  91  10-11    10-11 Chol 179 LDL 87 HDL 77 Trig 74    Vitamin B12, Serum: 864 pg/mL (10-11-21 @ 09:07)    A1C with Estimated Average Glucose (10.11.21 @ 09:07)   A1C with Estimated Average Glucose Result: 4.9%   Estimated Average Glucose: 94 mg/dL     Thyroid Stimulating Hormone, Serum: 5.41 uU/mL (10.11.21 @ 05:17)   Free Thyroxine, Serum: 1.3 ng/dL (10.11.21 @ 09:07)     Creatine Kinase, Serum: 145 U/L (10.10.21 @ 21:00)         NEUROIMAGING:      CT Head (10/10/21):  - No acute intracranial abnormality  - Chronic microvascular changes            Please contact the Neurology consult service with any neurological questions.    James Sheffield MD   of Neurology  [facility] at [facility]

## 2021-10-11 NOTE — H&P ADULT - PROBLEM SELECTOR PLAN 4
Hx of anxiety in the past  home med - Buspirone 10 mg TID  currently denies any episodes of anxiety  continue home med

## 2021-10-11 NOTE — H&P ADULT - HISTORY OF PRESENT ILLNESS
Patient is a 89 y/o F w/ PMHx of HTN, Hypothyroidism and Anxiety. She lives alone, no HHA and walks independently. She was brought by her neighbors to the E.D. due to sudden onset of bilateral leg weakness and inability to ambulate after speaking to someone in the phone. She has been having lower extremity weakness for the past 2 weeks w/ 2 episodes of losing balance and nearly falling. She denies any headache, dizziness, lightheadedness, blurring of vision, chest pain, SOB, palpitation, abdominal pain, diarrhea, constipation nor dysuria. She also denies any head trauma or fall. In the ED her BP was elevated at 211/76 HR - 76. There was slow improvement of her bilateral leg weakness. She was able to move both her lower extremities. There was also no numbness or loss of sensation. She was subsequently admitted for further work-up.

## 2021-10-11 NOTE — H&P ADULT - NSICDXFAMILYHX_GEN_ALL_CORE_FT
FAMILY HISTORY:  Father  Still living? No  Family history of diabetes mellitus (DM), Age at diagnosis: Age Unknown    Mother  Still living? No  FH: pancreatic cancer, Age at diagnosis: Age Unknown

## 2021-10-11 NOTE — PROGRESS NOTE ADULT - ASSESSMENT
Patient is a 89 y/o F w/ PMHx of HTN, Hypothyroidism, Anxiety who was admitted due to bilateral lower extremity weakness

## 2021-10-11 NOTE — H&P ADULT - NSICDXPASTMEDICALHX_GEN_ALL_CORE_FT
PAST MEDICAL HISTORY:  Anxiety     HLD (hyperlipidemia)     HTN (hypertension)     No pertinent past medical history

## 2021-10-11 NOTE — H&P ADULT - ASSESSMENT
Patient is a 89 y/o F w/ PMHx of HTN, Hypothyroidism, Anxiety who was admitted due to bilateral lower extremity weakness

## 2021-10-11 NOTE — PATIENT PROFILE ADULT - BRAND OF COVID-19 VACCINATION
[facility] Nurse Line at [phone]    Dr. Johanne Shelby DO, Dr. Patsy Lee DO, and RADHA Funk  [facility]  [address] [place] [address], [address] [phone]    Dr. Rizwan Garcia MD  [facility] [address] [address], [address], [place] [address] [phone]    Dr. Cosme Smith DO, Dr. Sanjiv Marin DO,  RADHA Doss, and RADHA Govea  [facility] [address] [address], [address], [place] [address] [phone]     Dr. Edith Ontiveros MD, and RADHA Giordano  [facility] [address] [address] [phone]    Dr. Ronald Miller MD and Dr. Juan Luis Wood MD  [facility]  "[facility]  [address], [address]  [phone]    Dr. Lamont Luis MD  [facility]  [address] [place] [address] B, [place], [place], [address] [phone]    Dr. Fabio Hays MD  [facility]  [address], [place], [address] [phone]    18/2006 Document Revised: 08/28/2019 Document Reviewed: 08/28/2019  [employer] [place]: www.nhlbi.nih.gov  · American Moderna dose 1 and 2

## 2021-10-11 NOTE — H&P ADULT - PROBLEM SELECTOR PLAN 5
RISK                                                          Points  [] Previous VTE                                           3  [] Thrombophilia                                        2  [x] Lower limb paralysis                              2   [] Current Cancer                                       2   [x] Immobilization > 24 hrs                        1  [] ICU/CCU stay > 24 hours                       1  [x] Age > 60                                                   1    Score: 4    Start Lovenox 40 mg SQ

## 2021-10-11 NOTE — H&P ADULT - NSHPLABSRESULTS_GEN_ALL_CORE
Complete Blood Count + Automated Diff (10.10.21 @ 21:00)   WBC Count: 6.02 K/uL   RBC Count: 4.65 M/uL   Hemoglobin: 13.2 g/dL   Hematocrit: 39.6 %   Mean Cell Volume: 85.2 fl   Mean Cell Hemoglobin: 28.4 pg   Mean Cell Hemoglobin Conc: 33.3 gm/dL   Red Cell Distrib Width: 13.5 %   Platelet Count - Automated: 229 K/uL   Auto Neutrophil #: 3.86 K/uL   Auto Lymphocyte #: 1.57 K/uL   Auto Monocyte #: 0.50 K/uL   Auto Eosinophil #: 0.04 K/uL   Auto Basophil #: 0.03 K/uL   Auto Neutrophil %: 64.1: Differential percentages must be correlated with absolute numbers for   clinical significance. %   Auto Lymphocyte %: 26.1 %   Auto Monocyte %: 8.3 %   Auto Eosinophil %: 0.7 %   Auto Basophil %: 0.5 %   Auto Immature Granulocyte %: 0.3: (Includes meta, myelo and promyelocytes) %   Nucleated RBC: 0 /100 WBCs Comprehensive Metabolic Panel (10.10.21 @ 21:00)   Sodium, Serum: 137 mmol/L   Potassium, Serum: 4.6: Specimen is moderately hemolyzed, results may be affected mmol/L   Chloride, Serum: 103 mmol/L   Carbon Dioxide, Serum: 28 mmol/L   Anion Gap, Serum: 6 mmol/L   Blood Urea Nitrogen, Serum: 12 mg/dL   Creatinine, Serum: 0.63 mg/dL   Glucose, Serum: 93 mg/dL   Calcium, Total Serum: 9.1 mg/dL   Protein Total, Serum: 7.6 g/dL   Albumin, Serum: 3.5 g/dL   Bilirubin Total, Serum: 0.8 mg/dL   Alkaline Phosphatase, Serum: 102 U/L   Aspartate Aminotransferase (AST/SGOT): 53 U/L   Alanine Aminotransferase (ALT/SGPT): 26 U/L DA Magnesium, Serum: 2.1 mg/dL Creatine Kinase, Serum: 145 U/L Urinalysis + Microscopic Examination (10.10.21 @ 21:00)   Specific Gravity: 1.015   Urobilinogen: Negative   Urine Appearance: Clear   Protein, Urine: Negative   pH Urine: 8.0   Leukocyte Esterase Concentration: Trace   Nitrite: Negative   Ketone - Urine: Negative   Bilirubin: Negative   Color: Yellow   Glucose Qualitative, Urine: Negative   Blood, Urine: Negative   Red Blood Cell - Urine: Negative /HPF   White Blood Cell - Urine: 3-5 /HPF   Epithelial Cells: Few /HPF   Bacteria: Trace /HPF   Comment - Urine: Occasional renal tubular epithelial cells present Thyroid Stimulating Hormone, Serum: 5.23 uU/mL COVID-19 PCR: NotDetec: EUA/IVD < from: CT Head No Cont (10.10.21 @ 20:57) >    FINDINGS: No intracranial hemorrhage is seen. The grey-white differentiation is maintained. Mild periventricular and subcortical white matter hypoattenuation without mass effect is noted, non-specific, but likely related to chronic small vessel ischemic changes.    Ventricles and sulci are normal in size and configuration for patient's age. No mass effect or midline shift is seen. Basal cisterns are not effaced.    Frontal secretions in the left sphenoid sinus. The tympanomastoid cavities are clear.    No osseous abnormality seen.    IMPRESSION: No intracranial hemorrhage or mass effect.    < end of copied text >

## 2021-10-11 NOTE — H&P ADULT - NSHPPHYSICALEXAM_GEN_ALL_CORE
Vital Signs  · Temp - 97.6 (36.4)  · Heart Rate - 78  · BP - 211/76  · Respiration Rate - 18  · SpO2 (%) - 98    PHYSICAL EXAM:  GENERAL: NAD, speaks in full sentences, no signs of respiratory distress  HEAD:  Atraumatic, Normocephalic  EYES: EOMI, PERRLA, conjunctiva and sclera clear  NECK: Supple, No JVD  CHEST/LUNG: Clear to auscultation bilaterally; No wheeze; No crackles; No accessory muscles used  HEART: Regular rate and rhythm; No murmurs;   ABDOMEN: Soft, Nontender, Nondistended; Bowel sounds present; No guarding  EXTREMITIES:  2+ Peripheral Pulses, No cyanosis or edema  BACK: (+) kyphosis  PSYCH: AAOx3  NEUROLOGY: non-focal; intact 2/2 reflexes; 5/5 strength on all extremities; intact sensation bilaterally  SKIN: multiple seborrheic keratosis Vital Signs  · Temp - 97.6 (36.4)  · Heart Rate - 78  · BP - 211/76  · Respiration Rate - 18  · SpO2 (%) - 98    PHYSICAL EXAM:  GENERAL: NAD, speaks in full sentences, no signs of respiratory distress  HEAD:  Atraumatic, Normocephalic  EYES: EOMI, PERRLA, conjunctiva and sclera clear  NECK: Supple, No JVD  CHEST/LUNG: Clear to auscultation bilaterally; No wheeze; No crackles; No accessory muscles used  HEART: Regular rate and rhythm; No murmurs;   ABDOMEN: Soft, Nontender, Nondistended; Bowel sounds present; No guarding  EXTREMITIES:  2+ Peripheral Pulses, No cyanosis or edema  BACK: (+) kyphosis  PSYCH: AAOx3  NEUROLOGY: non-focal; intact 2+ reflexes; 5/5 strength on all extremities; intact sensation bilaterally  SKIN: multiple seborrheic keratosis

## 2021-10-11 NOTE — H&P ADULT - NSHPSOCIALHISTORY_GEN_ALL_CORE
with 4 children  currently living alone w/o HHA  she ambulates independently  retired   non-smoker  non-alcoholic beverage drinker  no illicit drug use

## 2021-10-11 NOTE — CONSULT NOTE ADULT - TIME BILLING
I counseled the patient about her differential diagnoses, and the further testing indicated to help confirm her diagnosis, as well as the role of physical therapy to help with her recovery.

## 2021-10-12 LAB
ANION GAP SERPL CALC-SCNC: 4 MMOL/L — LOW (ref 5–17)
BUN SERPL-MCNC: 13 MG/DL — SIGNIFICANT CHANGE UP (ref 7–18)
CALCIUM SERPL-MCNC: 9.3 MG/DL — SIGNIFICANT CHANGE UP (ref 8.4–10.5)
CHLORIDE SERPL-SCNC: 100 MMOL/L — SIGNIFICANT CHANGE UP (ref 96–108)
CO2 SERPL-SCNC: 32 MMOL/L — HIGH (ref 22–31)
COVID-19 SPIKE DOMAIN AB INTERP: POSITIVE
COVID-19 SPIKE DOMAIN ANTIBODY RESULT: >250 U/ML — HIGH
CREAT SERPL-MCNC: 0.53 MG/DL — SIGNIFICANT CHANGE UP (ref 0.5–1.3)
CULTURE RESULTS: SIGNIFICANT CHANGE UP
FOLATE SERPL-MCNC: >20 NG/ML — SIGNIFICANT CHANGE UP
GLUCOSE SERPL-MCNC: 85 MG/DL — SIGNIFICANT CHANGE UP (ref 70–99)
HCT VFR BLD CALC: 41.2 % — SIGNIFICANT CHANGE UP (ref 34.5–45)
HCT VFR BLD CALC: 41.3 % — SIGNIFICANT CHANGE UP (ref 34.5–45)
HGB BLD-MCNC: 13.6 G/DL — SIGNIFICANT CHANGE UP (ref 11.5–15.5)
MCHC RBC-ENTMCNC: 28.3 PG — SIGNIFICANT CHANGE UP (ref 27–34)
MCHC RBC-ENTMCNC: 33 GM/DL — SIGNIFICANT CHANGE UP (ref 32–36)
MCV RBC AUTO: 85.7 FL — SIGNIFICANT CHANGE UP (ref 80–100)
NRBC # BLD: 0 /100 WBCS — SIGNIFICANT CHANGE UP (ref 0–0)
PHOSPHATE SERPL-MCNC: 3.4 MG/DL — SIGNIFICANT CHANGE UP (ref 2.5–4.5)
PLATELET # BLD AUTO: 219 K/UL — SIGNIFICANT CHANGE UP (ref 150–400)
POTASSIUM SERPL-MCNC: 3.9 MMOL/L — SIGNIFICANT CHANGE UP (ref 3.5–5.3)
POTASSIUM SERPL-SCNC: 3.9 MMOL/L — SIGNIFICANT CHANGE UP (ref 3.5–5.3)
RBC # BLD: 4.81 M/UL — SIGNIFICANT CHANGE UP (ref 3.8–5.2)
RBC # FLD: 13.8 % — SIGNIFICANT CHANGE UP (ref 10.3–14.5)
SARS-COV-2 IGG+IGM SERPL QL IA: >250 U/ML — HIGH
SARS-COV-2 IGG+IGM SERPL QL IA: POSITIVE
SODIUM SERPL-SCNC: 136 MMOL/L — SIGNIFICANT CHANGE UP (ref 135–145)
SPECIMEN SOURCE: SIGNIFICANT CHANGE UP
VIT D25+D1,25 OH+D1,25 PNL SERPL-MCNC: 79.6 PG/ML — HIGH (ref 19.9–79.3)
WBC # BLD: 5.16 K/UL — SIGNIFICANT CHANGE UP (ref 3.8–10.5)
WBC # FLD AUTO: 5.16 K/UL — SIGNIFICANT CHANGE UP (ref 3.8–10.5)

## 2021-10-12 RX ADMIN — Medication 81 MILLIGRAM(S): at 11:41

## 2021-10-12 RX ADMIN — Medication 10 MILLIGRAM(S): at 21:18

## 2021-10-12 RX ADMIN — VALSARTAN 320 MILLIGRAM(S): 80 TABLET ORAL at 05:20

## 2021-10-12 RX ADMIN — Medication 12.5 MILLIGRAM(S): at 05:20

## 2021-10-12 RX ADMIN — Medication 10 MILLIGRAM(S): at 05:20

## 2021-10-12 RX ADMIN — Medication 10 MILLIGRAM(S): at 13:21

## 2021-10-12 RX ADMIN — Medication 25 MICROGRAM(S): at 05:20

## 2021-10-12 RX ADMIN — ENOXAPARIN SODIUM 40 MILLIGRAM(S): 100 INJECTION SUBCUTANEOUS at 11:41

## 2021-10-12 NOTE — PROGRESS NOTE ADULT - ASSESSMENT
Patient is a 89 y/o F w/ PMHx of HTN, Hypothyroidism and Anxiety. She was brought by her neighbors to the E.D. due to sudden onset of bilateral leg weakness and inability to ambulate after speaking to someone in the phone. She has been having lower extremity weakness for the past 2 weeks w/ 2 episodes of losing balance and nearly falling. Patient admitted to medicine for bilateral LE weakness. Head CT negative for bleeding or mass, neurology dr. Sheffield consulted. PT consulted with recommendation for JOSE ROBERTO after discharge.

## 2021-10-12 NOTE — PHYSICAL THERAPY INITIAL EVALUATION ADULT - GENERAL OBSERVATIONS, REHAB EVAL
Consult received, EMR, radiology and labs reviewed. Patient received supine in bed, NAD, states feels better . Patient agreed to EVALUATION from Physical Therapist.

## 2021-10-13 LAB — FOLATE RBC-MCNC: 2470 NG/ML — HIGH (ref 499–1504)

## 2021-10-13 RX ADMIN — ENOXAPARIN SODIUM 40 MILLIGRAM(S): 100 INJECTION SUBCUTANEOUS at 12:24

## 2021-10-13 RX ADMIN — Medication 10 MILLIGRAM(S): at 12:25

## 2021-10-13 RX ADMIN — VALSARTAN 320 MILLIGRAM(S): 80 TABLET ORAL at 07:18

## 2021-10-13 RX ADMIN — Medication 25 MICROGRAM(S): at 07:19

## 2021-10-13 RX ADMIN — Medication 12.5 MILLIGRAM(S): at 07:19

## 2021-10-13 RX ADMIN — Medication 10 MILLIGRAM(S): at 07:19

## 2021-10-13 RX ADMIN — Medication 81 MILLIGRAM(S): at 12:24

## 2021-10-13 RX ADMIN — Medication 10 MILLIGRAM(S): at 22:03

## 2021-10-13 NOTE — DISCHARGE NOTE PROVIDER - NSDCCPCAREPLAN_GEN_ALL_CORE_FT
PRINCIPAL DISCHARGE DIAGNOSIS  Diagnosis: Lower extremity weakness  Assessment and Plan of Treatment:       SECONDARY DISCHARGE DIAGNOSES  Diagnosis: Hypothyroidism  Assessment and Plan of Treatment:     Diagnosis: Hypertensive urgency  Assessment and Plan of Treatment:      PRINCIPAL DISCHARGE DIAGNOSIS  Diagnosis: Lower extremity weakness  Assessment and Plan of Treatment: Plan for rehab for strengthening. Follow up [facility] neurology as oupatient upon discharge from rehab if symptoms worsen or do not improve in 2 weeks for EMG and workup for peripheral neuropathy.      SECONDARY DISCHARGE DIAGNOSES  Diagnosis: Hypertensive urgency  Assessment and Plan of Treatment: Continue with your blood pressure medications; eat a heart healthy diet with low salt diet; exercise regularly (consult with your physician or cardiologist first); maintain a heart healthy weight; if you smoke - quit (A resource to help you stop smoking is the [facility] – phone number [phone].); include healthy ways to manage stress. Continue to follow with your primary care physician or cardiologist.      Diagnosis: Hypothyroidism  Assessment and Plan of Treatment: Take your medication as prescribed.

## 2021-10-13 NOTE — DISCHARGE NOTE PROVIDER - CARE PROVIDERS DIRECT ADDRESSES
,DirectAddress_Unknown ,DirectAddress_Unknown,[email or web address]

## 2021-10-13 NOTE — PROGRESS NOTE ADULT - ASSESSMENT
Patient is a 89 y/o F w/ PMHx of HTN, Hypothyroidism and Anxiety. She was brought by her neighbors to the E.D. due to sudden onset of bilateral leg weakness and inability to ambulate after speaking to someone in the phone. She has been having lower extremity weakness for the past 2 weeks w/ 2 episodes of losing balance and nearly falling. Patient admitted to medicine for bilateral LE weakness. Head CT negative for bleeding or mass, neurology dr. Sheffield consulted. PT consulted with recommendation for JOSE ROBERTO after discharge.     10/13  patient seen and examined at bedside, able to ambulate with assistance, CT cervical and lumbar spine pending. DC planning for JOSE ROBERTO after discharge.     Patient is a 89 y/o F w/ PMHx of HTN, Hypothyroidism and Anxiety. She was brought by her neighbors to the E.D. due to sudden onset of bilateral leg weakness and inability to ambulate after speaking to someone in the phone. She has been having lower extremity weakness for the past 2 weeks w/ 2 episodes of losing balance and nearly falling. Patient admitted to medicine for bilateral LE weakness. Head CT negative for bleeding or mass, neurology dr. Sheffield consulted. PT consulted with recommendation for JOSE ROBERTO after discharge.     10/13  patient seen and examined at bedside, able to ambulate with assistance, CT cervical and lumbar spine pending. DC planning for JOSE ROBERTO after discharge.  Patient refused CT despite being in agreement for CT 1 hour prior the test.

## 2021-10-13 NOTE — DISCHARGE NOTE PROVIDER - NSDCMRMEDTOKEN_GEN_ALL_CORE_FT
aspirin 81 mg oral tablet: 1 tab(s) orally once a day  busPIRone 10 mg oral tablet: 1 tab(s) orally 3 times a day  levothyroxine 25 mcg (0.025 mg) oral tablet: 1 tab(s) orally once a day  valsartan-hydrochlorothiazide 320 mg-12.5 mg oral tablet: 1 tab(s) orally once a day   aspirin 81 mg oral tablet, chewable: 1 tab(s) orally once a day  busPIRone 10 mg oral tablet: 1 tab(s) orally 3 times a day  hydroCHLOROthiazide 12.5 mg oral capsule: 1 cap(s) orally once a day  levothyroxine 25 mcg (0.025 mg) oral tablet: 1 tab(s) orally once a day  valsartan 320 mg oral tablet: 1 tab(s) orally once a day

## 2021-10-13 NOTE — DISCHARGE NOTE PROVIDER - PROVIDER TOKENS
PROVIDER:[TOKEN:[[id number]],FOLLOWUP:[1 week]] PROVIDER:[TOKEN:[[id number]],FOLLOWUP:[1 week]],PROVIDER:[TOKEN:[[id number]]]

## 2021-10-13 NOTE — DISCHARGE NOTE PROVIDER - CARE PROVIDER_API CALL
Ryan Redd)  [facility]  [address] [place] [address], [place] [address]  Phone: [phone]  Fax: [phone]  Follow Up Time: 1 week   Ryan Redd)  Medicine  [address] [place] [address], [address]  [place] [address]  Phone: [phone]  Fax: [phone]  Follow Up Time: 1 week    James Sheffield)  Neurology  Epilepsy  [address] [facility], [address]  [place] [address]  Phone: [phone]  Fax: [phone]  Follow Up Time:

## 2021-10-13 NOTE — DISCHARGE NOTE PROVIDER - HOSPITAL COURSE
Patient is a 89 year old Female with PMHx of HTN, Hypothyroidism and Anxiety. Patient presented to E.D. due to sudden onset of bilateral leg weakness and inability to ambulate. Progressive bilateral leg swelling weakness for the past 2 weeks with 2 episodes of losing balance and nearly falling. Patient admitted to medicine for bilateral LE weakness. Head CT negative for bleeding or mass, neurology Dr. Sheffield consulted. PT consulted with recommendation for JOSE ROBERTO after discharge. Patient is a 89 year old Female with PMHx of HTN, Hypothyroidism and Anxiety. Patient presented to E.D. due to sudden onset of bilateral leg weakness and inability to ambulate. Progressive bilateral leg weakness for the past 2 weeks with 2 episodes of losing balance and nearly falling. Patient admitted to medicine for bilateral LE weakness. Head CT negative for bleeding or mass. Patient decline MRI due to claustrophobia.  CT cervical and lumbar spine resulting [id number].  Neurology Dr. Sheffield consulted. Neurology recommending If symptoms persist for at least 2 weeks, the patient may be considered for an outpatient EMG/NCV to evaluate for peripheral neuropathy.  Patient found to have Hypertensive Urgency treated with hydralazine and home BP medication.   PT consulted with recommendation for JOSE ROBERTO after discharge. Patient is a 89 year old Female with PMHx of HTN, Hypothyroidism and Anxiety. Patient presented to E.D. due to sudden onset of bilateral leg weakness and inability to ambulate. Progressive bilateral leg weakness for the past 2 weeks with 2 episodes of losing balance and nearly falling. Patient admitted to medicine for bilateral LE weakness. Head CT negative for bleeding or mass. Patient decline MRI due to claustrophobia.  Neurology Dr. Sheffield consulted. Neurology recommending If symptoms persist for at least 2 weeks, the patient may be considered for an outpatient EMG/NCV to evaluate for peripheral neuropathy.  Patient found to have Hypertensive Urgency treated with hydralazine and home BP medication.   PT consulted with recommendation for JOSE ROBERTO after discharge. Patient is now medically stable for discharge to rehab.

## 2021-10-14 DIAGNOSIS — Z02.9 ENCOUNTER FOR ADMINISTRATIVE EXAMINATIONS, UNSPECIFIED: ICD-10-CM

## 2021-10-14 PROCEDURE — 99497 ADVNCD CARE PLAN 30 MIN: CPT

## 2021-10-14 RX ORDER — PANTOPRAZOLE SODIUM 20 MG/1
40 TABLET, DELAYED RELEASE ORAL
Refills: 0 | Status: DISCONTINUED | OUTPATIENT
Start: 2021-10-14 | End: 2021-10-15

## 2021-10-14 RX ADMIN — VALSARTAN 320 MILLIGRAM(S): 80 TABLET ORAL at 05:53

## 2021-10-14 RX ADMIN — Medication 81 MILLIGRAM(S): at 12:00

## 2021-10-14 RX ADMIN — Medication 10 MILLIGRAM(S): at 21:20

## 2021-10-14 RX ADMIN — Medication 10 MILLIGRAM(S): at 05:52

## 2021-10-14 RX ADMIN — Medication 10 MILLIGRAM(S): at 14:19

## 2021-10-14 RX ADMIN — Medication 12.5 MILLIGRAM(S): at 05:52

## 2021-10-14 RX ADMIN — Medication 25 MICROGRAM(S): at 05:52

## 2021-10-14 RX ADMIN — ENOXAPARIN SODIUM 40 MILLIGRAM(S): 100 INJECTION SUBCUTANEOUS at 12:00

## 2021-10-14 NOTE — PROGRESS NOTE ADULT - CONVERSATION DETAILS
Spoke in full detail with patient on life sustaining treatment. Patient does not want to be resuscitated and mechanically intubated in the event of cardiac and respiratory arrest. Patient states that she is 89 years old and does not want to suffer on machines. Patient signed and consents to [facility].

## 2021-10-14 NOTE — PROGRESS NOTE ADULT - ASSESSMENT
Patient is a 89 year old Female with PMHx of HTN, Hypothyroidism and Anxiety. Patient presented to E.D. due to sudden onset of bilateral leg weakness and inability to ambulate. Progressive bilateral leg weakness for the past 2 weeks with 2 episodes of losing balance and nearly falling. Patient admitted to medicine for bilateral LE weakness. Head CT negative for bleeding or mass. Patient decline MRI due to claustrophobia.  CT cervical and lumbar spine recommended however patient refused to have exam, as she already had CT head. Patient does not want to be bothered with further imaging, explained in detailed the recommendation.  Neurology Dr. Sheffield consulted. Neurology recommending If symptoms persist for at least 2 weeks, the patient may be considered for an outpatient EMG/NCV to evaluate for peripheral neuropathy.  Patient found to have Hypertensive Urgency treated with hydralazine and home BP medication.   PT consulted with recommendation for JOSE ROBERTO after discharge.

## 2021-10-15 VITALS
TEMPERATURE: 97 F | SYSTOLIC BLOOD PRESSURE: 157 MMHG | RESPIRATION RATE: 20 BRPM | OXYGEN SATURATION: 96 % | HEART RATE: 81 BPM | DIASTOLIC BLOOD PRESSURE: 76 MMHG

## 2021-10-15 LAB
ANION GAP SERPL CALC-SCNC: 5 MMOL/L — SIGNIFICANT CHANGE UP (ref 5–17)
BUN SERPL-MCNC: 15 MG/DL — SIGNIFICANT CHANGE UP (ref 7–18)
CALCIUM SERPL-MCNC: 8.8 MG/DL — SIGNIFICANT CHANGE UP (ref 8.4–10.5)
CHLORIDE SERPL-SCNC: 100 MMOL/L — SIGNIFICANT CHANGE UP (ref 96–108)
CO2 SERPL-SCNC: 32 MMOL/L — HIGH (ref 22–31)
CREAT SERPL-MCNC: 0.51 MG/DL — SIGNIFICANT CHANGE UP (ref 0.5–1.3)
GLUCOSE SERPL-MCNC: 91 MG/DL — SIGNIFICANT CHANGE UP (ref 70–99)
HCT VFR BLD CALC: 38.9 % — SIGNIFICANT CHANGE UP (ref 34.5–45)
HGB BLD-MCNC: 12.7 G/DL — SIGNIFICANT CHANGE UP (ref 11.5–15.5)
MCHC RBC-ENTMCNC: 28.2 PG — SIGNIFICANT CHANGE UP (ref 27–34)
MCHC RBC-ENTMCNC: 32.6 GM/DL — SIGNIFICANT CHANGE UP (ref 32–36)
MCV RBC AUTO: 86.3 FL — SIGNIFICANT CHANGE UP (ref 80–100)
NRBC # BLD: 0 /100 WBCS — SIGNIFICANT CHANGE UP (ref 0–0)
PLATELET # BLD AUTO: 229 K/UL — SIGNIFICANT CHANGE UP (ref 150–400)
POTASSIUM SERPL-MCNC: 4.2 MMOL/L — SIGNIFICANT CHANGE UP (ref 3.5–5.3)
POTASSIUM SERPL-SCNC: 4.2 MMOL/L — SIGNIFICANT CHANGE UP (ref 3.5–5.3)
RBC # BLD: 4.51 M/UL — SIGNIFICANT CHANGE UP (ref 3.8–5.2)
RBC # FLD: 13.8 % — SIGNIFICANT CHANGE UP (ref 10.3–14.5)
SARS-COV-2 RNA SPEC QL NAA+PROBE: SIGNIFICANT CHANGE UP
SODIUM SERPL-SCNC: 137 MMOL/L — SIGNIFICANT CHANGE UP (ref 135–145)
WBC # BLD: 3.88 K/UL — SIGNIFICANT CHANGE UP (ref 3.8–10.5)
WBC # FLD AUTO: 3.88 K/UL — SIGNIFICANT CHANGE UP (ref 3.8–10.5)

## 2021-10-15 PROCEDURE — 87635 SARS-COV-2 COVID-19 AMP PRB: CPT

## 2021-10-15 PROCEDURE — 86769 SARS-COV-2 COVID-19 ANTIBODY: CPT

## 2021-10-15 PROCEDURE — 99285 EMERGENCY DEPT VISIT HI MDM: CPT | Mod: 25

## 2021-10-15 PROCEDURE — 85027 COMPLETE CBC AUTOMATED: CPT

## 2021-10-15 PROCEDURE — 81001 URINALYSIS AUTO W/SCOPE: CPT

## 2021-10-15 PROCEDURE — 97116 GAIT TRAINING THERAPY: CPT

## 2021-10-15 PROCEDURE — 93005 ELECTROCARDIOGRAM TRACING: CPT

## 2021-10-15 PROCEDURE — 97162 PT EVAL MOD COMPLEX 30 MIN: CPT

## 2021-10-15 PROCEDURE — 84443 ASSAY THYROID STIM HORMONE: CPT

## 2021-10-15 PROCEDURE — 70450 CT HEAD/BRAIN W/O DYE: CPT | Mod: MA

## 2021-10-15 PROCEDURE — 80048 BASIC METABOLIC PNL TOTAL CA: CPT

## 2021-10-15 PROCEDURE — 36415 COLL VENOUS BLD VENIPUNCTURE: CPT

## 2021-10-15 PROCEDURE — 82607 VITAMIN B-12: CPT

## 2021-10-15 PROCEDURE — 83036 HEMOGLOBIN GLYCOSYLATED A1C: CPT

## 2021-10-15 PROCEDURE — 83090 ASSAY OF HOMOCYSTEINE: CPT

## 2021-10-15 PROCEDURE — 82747 ASSAY OF FOLIC ACID RBC: CPT

## 2021-10-15 PROCEDURE — 85025 COMPLETE CBC W/AUTO DIFF WBC: CPT

## 2021-10-15 PROCEDURE — 82962 GLUCOSE BLOOD TEST: CPT

## 2021-10-15 PROCEDURE — 87086 URINE CULTURE/COLONY COUNT: CPT

## 2021-10-15 PROCEDURE — 97530 THERAPEUTIC ACTIVITIES: CPT

## 2021-10-15 PROCEDURE — 84100 ASSAY OF PHOSPHORUS: CPT

## 2021-10-15 PROCEDURE — 82550 ASSAY OF CK (CPK): CPT

## 2021-10-15 PROCEDURE — 82746 ASSAY OF FOLIC ACID SERUM: CPT

## 2021-10-15 PROCEDURE — 80061 LIPID PANEL: CPT

## 2021-10-15 PROCEDURE — 83735 ASSAY OF MAGNESIUM: CPT

## 2021-10-15 PROCEDURE — 80053 COMPREHEN METABOLIC PANEL: CPT

## 2021-10-15 PROCEDURE — 86780 TREPONEMA PALLIDUM: CPT

## 2021-10-15 PROCEDURE — 84439 ASSAY OF FREE THYROXINE: CPT

## 2021-10-15 PROCEDURE — 82652 VIT D 1 25-DIHYDROXY: CPT

## 2021-10-15 RX ORDER — LEVOTHYROXINE SODIUM 125 MCG
1 TABLET ORAL
Qty: 0 | Refills: 0 | DISCHARGE
Start: 2021-10-15

## 2021-10-15 RX ORDER — LEVOTHYROXINE SODIUM 125 MCG
1 TABLET ORAL
Qty: 0 | Refills: 0 | DISCHARGE

## 2021-10-15 RX ORDER — ASPIRIN/CALCIUM CARB/MAGNESIUM 324 MG
1 TABLET ORAL
Qty: 0 | Refills: 0 | DISCHARGE
Start: 2021-10-15

## 2021-10-15 RX ORDER — ASPIRIN/CALCIUM CARB/MAGNESIUM 324 MG
1 TABLET ORAL
Qty: 0 | Refills: 0 | DISCHARGE

## 2021-10-15 RX ORDER — VALSARTAN 80 MG/1
1 TABLET ORAL
Qty: 0 | Refills: 0 | DISCHARGE
Start: 2021-10-15

## 2021-10-15 RX ADMIN — Medication 25 MICROGRAM(S): at 05:26

## 2021-10-15 RX ADMIN — ENOXAPARIN SODIUM 40 MILLIGRAM(S): 100 INJECTION SUBCUTANEOUS at 12:57

## 2021-10-15 RX ADMIN — Medication 12.5 MILLIGRAM(S): at 05:26

## 2021-10-15 RX ADMIN — Medication 10 MILLIGRAM(S): at 05:26

## 2021-10-15 RX ADMIN — Medication 81 MILLIGRAM(S): at 12:56

## 2021-10-15 RX ADMIN — VALSARTAN 320 MILLIGRAM(S): 80 TABLET ORAL at 05:26

## 2021-10-15 RX ADMIN — Medication 10 MILLIGRAM(S): at 16:44

## 2021-10-15 RX ADMIN — PANTOPRAZOLE SODIUM 40 MILLIGRAM(S): 20 TABLET, DELAYED RELEASE ORAL at 05:26

## 2021-10-15 NOTE — PROGRESS NOTE ADULT - PROBLEM SELECTOR PLAN 2
Hx of HTN  home med - valsartan 320/ HCTZ 12.5 mg OD  BP on admission - 211/76>>166/74 (asymptomatic)  continue home meds - valsartan 320 mg and HCTZ 12.5 mg  CT Head - neg for ischemia, hemorrhage  monitor CBC, BMP daily  monitor BP
Hx of HTN  home med - valsartan 320/ HCTZ 12.5 mg OD  BP on admission - 211/76>>166/74 (asymptomatic)  continue home meds - valsartan 320 mg and HCTZ 12.5 mg  CT Head - neg for ischemia, hemorrhage  monitor CBC, BMP daily  monitor BP.
Hx of HTN  home med - valsartan 320/ HCTZ 12.5 mg OD  BP on admission - 211/76>>166/74 (asymptomatic)  continue home meds - valsartan 320 mg and HCTZ 12.5 mg  CT Head - neg for ischemia, hemorrhage  monitor CBC, BMP daily  monitor BP
c/w valsartan 320 mg and HCTZ 12.5 mg  CT Head - neg for ischemia, hemorrhage  controlled
c/w valsartan 320 mg and HCTZ 12.5 mg  CT Head - neg for ischemia, hemorrhage  controlled
Hx of HTN  home med - valsartan 320/ HCTZ 12.5 mg OD  BP on admission - 211/76>>166/74 (asymptomatic)  continue home meds - valsartan 320 mg and HCTZ 12.5 mg  CT Head - neg for ischemia, hemorrhage  monitor CBC, BMP daily  monitor BP.

## 2021-10-15 NOTE — PROGRESS NOTE ADULT - PROBLEM SELECTOR PLAN 3
c/w Levothyroxine
Hx of Hypothyroidism  home med - Levothyroxine 25 mcg/ tab OD  TSH - 5.23  f/u FT4
Hx of Hypothyroidism  home med - Levothyroxine 25 mcg/ tab OD  TSH - 5.23  f/u FT4
c/w Levothyroxine
Hx of Hypothyroidism  home med - Levothyroxine 25 mcg/ tab OD  TSH - 5.41  f/u FT4.
Hx of Hypothyroidism  home med - Levothyroxine 25 mcg/ tab OD  TSH - 5.41  f/u FT4.

## 2021-10-15 NOTE — PROGRESS NOTE ADULT - PROBLEM SELECTOR PLAN 6
PT recommending JOSE ROBERTO  pending authorization  f/u COVID 10/15
PT recommending JOSE ROBERTO  pending authorization  f/u COVID 10/15

## 2021-10-15 NOTE — PROGRESS NOTE ADULT - PROBLEM SELECTOR PLAN 5
RISK                                                          Points  [] Previous VTE                                           3  [] Thrombophilia                                        2  [x] Lower limb paralysis                              2   [] Current Cancer                                       2   [x] Immobilization > 24 hrs                        1  [] ICU/CCU stay > 24 hours                       1  [x] Age > 60                                                   1    Score: 4    Start Lovenox 40 mg SQ
Lovenox 40mg qd for DVT ppx.
RISK                                                          Points  [] Previous VTE                                           3  [] Thrombophilia                                        2  [x] Lower limb paralysis                              2   [] Current Cancer                                       2   [x] Immobilization > 24 hrs                        1  [] ICU/CCU stay > 24 hours                       1  [x] Age > 60                                                   1    Score: 4    Start Lovenox 40 mg SQ
Lovenox 40mg qd for DVT ppx.
DVT PPX: Lovenox  GI PPX: PPI
DVT PPX: Lovenox  GI PPX: PPI

## 2021-10-15 NOTE — PROGRESS NOTE ADULT - REASON FOR ADMISSION
lower extremity weakness

## 2021-10-15 NOTE — PROGRESS NOTE ADULT - PROBLEM SELECTOR PROBLEM 1
Lower extremity weakness

## 2021-10-15 NOTE — PROGRESS NOTE ADULT - PROBLEM SELECTOR PLAN 1
Presented w/ sudden onset of b/l LE weakness  has difficulty standing and ambulating  baseline: ambulates independently  has 2 episodes of near fall/ loss of balance past 2 weeks  Sx improved since admission  CBC, BMP, UA - unremarkable  CT Head - neg for ischemia, hemorrhage  fall precaution  f/u Vit. B12 level  f/u folate  Neuro consult (Dr. Sheffield)  PT consult recommendation for JOSE ROBERTO after discharge.
Presented w/ sudden onset of b/l LE weakness  has difficulty standing and ambulating  baseline: ambulates independently  has 2 episodes of near fall/ loss of balance past 2 weeks  Sx improved since admission  CBC, BMP, UA - unremarkable  CT Head - neg for ischemia, hemorrhage  fall precaution  f/u Vit. B12 level  f/u folate  Neuro consult (Dr. Sheffield)  CT cervical and lumbar spine pending  PT consult recommendation for JOSE ROBERTO after discharge.
Presented w/ sudden onset of b/l LE weakness  has difficulty standing and ambulating  baseline: ambulates independently  has 2 episodes of near fall/ loss of balance past 2 weeks  Sx improved since admission  CBC, BMP, UA - unremarkable  CT Head - neg for ischemia, hemorrhage  fall precaution  f/u Vit. B12 level  f/u Neuro consult (Dr. Gerard)  f/u PT consult
Presented w/ sudden onset of b/l LE weakness  has difficulty standing and ambulating  baseline: ambulates independently  has 2 episodes of near fall/ loss of balance past 2 weeks  Sx improved since admission  CBC, BMP, UA - unremarkable  CT Head - neg for ischemia, hemorrhage  fall precaution  Neuro consult (Dr. Sheffield)
Presented w/ sudden onset of b/l LE weakness  has difficulty standing and ambulating  baseline: ambulates independently  has 2 episodes of near fall/ loss of balance past 2 weeks  Sx improved since admission  CBC, BMP, UA - unremarkable  CT Head - neg for ischemia, hemorrhage  fall precaution  Neuro consult (Dr. Sheffield)
Presented w/ sudden onset of b/l LE weakness  has difficulty standing and ambulating  baseline: ambulates independently  has 2 episodes of near fall/ loss of balance past 2 weeks  Sx improved since admission  CBC, BMP, UA - unremarkable  CT Head - neg for ischemia, hemorrhage  fall precaution  f/u Vit. B12 level  f/u PT consult

## 2021-10-15 NOTE — PROGRESS NOTE ADULT - SUBJECTIVE AND OBJECTIVE BOX
Patient was seen and examined  Patient is a 89y old  Female who presents with a chief complaint of lower extremity weakness (11 Oct 2021 02:15)      INTERVAL HPI/OVERNIGHT EVENTS:  T(C): 36.6 (10-11-21 @ 07:51), Max: 36.6 (10-11-21 @ 07:51)  HR: 73 (10-11-21 @ 07:51) (73 - 89)  BP: 148/52 (10-11-21 @ 07:51) (148/52 - 211/76)  RR: 18 (10-11-21 @ 07:51) (18 - 18)  SpO2: 93% (10-11-21 @ 07:51) (93% - 99%)  Wt(kg): --  I&O's Summary      LABS:                        13.3   6.67  )-----------( 224      ( 11 Oct 2021 05:17 )             40.1     10-11    139  |  105  |  8   ----------------------------<  106<H>  4.1   |  29  |  0.58    Ca    9.0      11 Oct 2021 05:17  Phos  3.0     10-11  Mg     2.0     10-11    TPro  7.1  /  Alb  3.3<L>  /  TBili  0.9  /  DBili  x   /  AST  20  /  ALT  19  /  AlkPhos  91  10-11      Urinalysis Basic - ( 10 Oct 2021 21:00 )    Color: Yellow / Appearance: Clear / S.015 / pH: x  Gluc: x / Ketone: Negative  / Bili: Negative / Urobili: Negative   Blood: x / Protein: Negative / Nitrite: Negative   Leuk Esterase: Trace / RBC: Negative /HPF / WBC 3-5 /HPF   Sq Epi: x / Non Sq Epi: Few /HPF / Bacteria: Trace /HPF      CAPILLARY BLOOD GLUCOSE      POCT Blood Glucose.: 109 mg/dL (10 Oct 2021 19:29)    LIPID PANEL  Cholesterol 179  LDL --  HDL 77  RATIO HDL/Total Cholesterol --  Triglyceride 74        Urinalysis Basic - ( 10 Oct 2021 21:00 )    Color: Yellow / Appearance: Clear / S.015 / pH: x  Gluc: x / Ketone: Negative  / Bili: Negative / Urobili: Negative   Blood: x / Protein: Negative / Nitrite: Negative   Leuk Esterase: Trace / RBC: Negative /HPF / WBC 3-5 /HPF   Sq Epi: x / Non Sq Epi: Few /HPF / Bacteria: Trace /HPF        MEDICATIONS  (STANDING):  aspirin  chewable 81 milliGRAM(s) Oral daily  busPIRone 10 milliGRAM(s) Oral three times a day  enoxaparin Injectable 40 milliGRAM(s) SubCutaneous daily  hydrochlorothiazide 12.5 milliGRAM(s) Oral daily  levothyroxine 25 MICROGram(s) Oral daily  valsartan 320 milliGRAM(s) Oral daily    MEDICATIONS  (PRN):      RADIOLOGY & ADDITIONAL TESTS:    Imaging Personally Reviewed:  [ ] YES  [ ] NO    REVIEW OF SYSTEMS:  CONSTITUTIONAL: No fever, weight loss, or fatigue  EYES: No eye pain, visual disturbances, or discharge  ENMT:  No difficulty hearing, tinnitus, vertigo; No sinus or throat pain  NECK: No pain or stiffness  BREASTS: No pain, masses, or nipple discharge  RESPIRATORY: No cough, wheezing, chills or hemoptysis; No shortness of breath  CARDIOVASCULAR: No chest pain, palpitations, dizziness, or leg swelling  GASTROINTESTINAL: No abdominal or epigastric pain. No nausea, vomiting, or hematemesis; No diarrhea or constipation. No melena or hematochezia.  GENITOURINARY: No dysuria, frequency, hematuria, or incontinence  NEUROLOGICAL: No headaches, memory loss, loss of strength, numbness, or tremors  SKIN: No itching, burning, rashes, or lesions   LYMPH NODES: No enlarged glands  ENDOCRINE: No heat or cold intolerance; No hair loss  MUSCULOSKELETAL: No joint pain or swelling; No muscle, back, or extremity pain  PSYCHIATRIC: No depression, anxiety, mood swings, or difficulty sleeping  HEME/LYMPH: No easy bruising, or bleeding gums  ALLERY AND IMMUNOLOGIC: No hives or eczema      Consultant(s) Notes Reviewed:  [ ] YES  [ ] NO    PHYSICAL EXAM:  GENERAL: NAD, well-groomed, well-developed  HEAD:  Atraumatic, Normocephalic  EYES: EOMI, PERRLA, conjunctiva and sclera clear  ENMT: No tonsillar erythema, exudates, or enlargement; Moist mucous membranes, Good dentition, No lesions  NECK: Supple, No JVD, Normal thyroid  NERVOUS SYSTEM:  poor gait   CHEST/LUNG: Clear to percussion bilaterally; No rales, rhonchi, wheezing, or rubs  HEART: Regular rate and rhythm; No murmurs, rubs, or gallops  ABDOMEN: Soft, Nontender, Nondistended; Bowel sounds present  EXTREMITIES:  2+ Peripheral Pulses, No clubbing, cyanosis, or edema  LYMPH: No lymphadenopathy noted  SKIN: No rashes or lesions    Care Discussed with Consultants/Other Providers [ x] YES  [ ] NO
NP Note discussed with  Primary Attending    Patient is a 89y old  Female who presents with a chief complaint of lower extremity weakness (13 Oct 2021 15:56)      INTERVAL HPI/OVERNIGHT EVENTS: Patient seen and examined at bedside, no new complaints    MEDICATIONS  (STANDING):  aspirin  chewable 81 milliGRAM(s) Oral daily  busPIRone 10 milliGRAM(s) Oral three times a day  enoxaparin Injectable 40 milliGRAM(s) SubCutaneous daily  hydrochlorothiazide 12.5 milliGRAM(s) Oral daily  levothyroxine 25 MICROGram(s) Oral daily  valsartan 320 milliGRAM(s) Oral daily    MEDICATIONS  (PRN):      __________________________________________________  REVIEW OF SYSTEMS:    CONSTITUTIONAL: No fever,   EYES: no acute visual disturbances  NECK: No pain or stiffness  RESPIRATORY: No cough; No shortness of breath  CARDIOVASCULAR: No chest pain, no palpitations  GASTROINTESTINAL: No pain. No nausea or vomiting; No diarrhea   NEUROLOGICAL: No headache or numbness, no tremors  MUSCULOSKELETAL: No joint pain, no muscle pain  GENITOURINARY: no dysuria, no frequency, no hesitancy  PSYCHIATRY: no depression , no anxiety  ALL OTHER  ROS negative        Vital Signs Last 24 Hrs  T(C): 36.6 (14 Oct 2021 14:15), Max: 36.6 (13 Oct 2021 20:48)  T(F): 97.9 (14 Oct 2021 14:15), Max: 97.9 (13 Oct 2021 20:48)  HR: 85 (14 Oct 2021 14:15) (72 - 94)  BP: 121/74 (14 Oct 2021 14:15) (108/62 - 154/64)  BP(mean): --  RR: 16 (14 Oct 2021 14:15) (16 - 18)  SpO2: 95% (14 Oct 2021 14:15) (95% - 100%)    ________________________________________________  PHYSICAL EXAM:  GENERAL: NAD  HEENT: Normocephalic;  conjunctivae and sclerae clear; moist mucous membranes;   NECK : supple  CHEST/LUNG: Clear to auscultation bilaterally with good air entry   HEART: S1 S2  regular; no murmurs, gallops or rubs  ABDOMEN: Soft, Nontender, Nondistended; Bowel sounds present  EXTREMITIES: no cyanosis; no edema; no calf tenderness  SKIN: warm and dry; no rash  NERVOUS SYSTEM:  Awake and alert; Oriented  to place, person and time ; no new deficits    _________________________________________________  LABS:              CAPILLARY BLOOD GLUCOSE            RADIOLOGY & ADDITIONAL TESTS:  < from: CT Head No Cont (10.10.21 @ 20:57) >    EXAM:  CT BRAIN                            PROCEDURE DATE:  10/10/2021          INTERPRETATION:  CLINICAL INFORMATION: Leg weakness    TECHNIQUE: Noncontrast axial CT images of the brain were acquired from the base of skull to vertex.    COMPARISON: None.    FINDINGS: No intracranial hemorrhage is seen. The grey-white differentiation is maintained. Mild periventricular and subcortical white matter hypoattenuation without mass effect is noted, non-specific, but likely related to chronic small vessel ischemic changes.    Ventricles and sulci are normal in size and configuration for patient's age. No mass effect or midline shift is seen. Basal cisterns are not effaced.    Frontal secretions in the left sphenoid sinus. The tympanomastoid cavities are clear.    No osseous abnormality seen.    IMPRESSION: No intracranial hemorrhage or mass effect.    < end of copied text >    Imaging  Reviewed:  YES    Consultant(s) Notes Reviewed:   YES      Plan of care was discussed with patient and /or primary care giver; all questions and concerns were addressed 
NP Note discussed with Primary Attending.    Patient is a 89y old  Female who presents with a chief complaint of lower extremity weakness (13 Oct 2021 14:27)      INTERVAL HPI/OVERNIGHT EVENTS: no new complaints    MEDICATIONS  (STANDING):  aspirin  chewable 81 milliGRAM(s) Oral daily  busPIRone 10 milliGRAM(s) Oral three times a day  enoxaparin Injectable 40 milliGRAM(s) SubCutaneous daily  hydrochlorothiazide 12.5 milliGRAM(s) Oral daily  levothyroxine 25 MICROGram(s) Oral daily  valsartan 320 milliGRAM(s) Oral daily    MEDICATIONS  (PRN):      __________________________________________________  REVIEW OF SYSTEMS:    CONSTITUTIONAL: No fever,   EYES: no acute visual disturbances  NECK: No pain or stiffness  RESPIRATORY: No cough; No shortness of breath  CARDIOVASCULAR: No chest pain, no palpitations  GASTROINTESTINAL: No pain. No nausea or vomiting; No diarrhea   NEUROLOGICAL: No headache or numbness, no tremors  MUSCULOSKELETAL: No joint pain, no muscle pain  GENITOURINARY: no dysuria, no frequency, no hesitancy  PSYCHIATRY: no depression , no anxiety  ALL OTHER  ROS negative        Vital Signs Last 24 Hrs  T(C): 36.6 (13 Oct 2021 13:25), Max: 37 (12 Oct 2021 20:37)  T(F): 97.8 (13 Oct 2021 13:25), Max: 98.6 (12 Oct 2021 20:37)  HR: 76 (13 Oct 2021 13:25) (68 - 89)  BP: 166/69 (13 Oct 2021 13:25) (115/66 - 166/69)  BP(mean): --  RR: 20 (13 Oct 2021 13:25) (16 - 20)  SpO2: 97% (13 Oct 2021 13:25) (96% - 97%)    ________________________________________________  PHYSICAL EXAM:  GENERAL: NAD  HEENT: Normocephalic;  conjunctivae and sclerae clear; moist mucous membranes;   NECK : supple  CHEST/LUNG: Clear to auscultation bilaterally with good air entry   HEART: S1 S2  regular; no murmurs, gallops or rubs  ABDOMEN: Soft, Nontender, Nondistended; Bowel sounds present  EXTREMITIES: no cyanosis; no edema; no calf tenderness  SKIN: warm and dry; no rash  NERVOUS SYSTEM:  Awake and alert; Oriented  to place, person and time ; no new deficits    _________________________________________________  LABS:                        x      x     )-----------( x        ( 12 Oct 2021 10:57 )             41.3     10-12    136  |  100  |  13  ----------------------------<  85  3.9   |  32<H>  |  0.53    Ca    9.3      12 Oct 2021 08:21  Phos  3.4     10-12          CAPILLARY BLOOD GLUCOSE            RADIOLOGY & ADDITIONAL TESTS:  EXAM:  CT BRAIN                            PROCEDURE DATE:  10/10/2021          INTERPRETATION:  CLINICAL INFORMATION: Leg weakness    TECHNIQUE: Noncontrast axial CT images of the brain were acquired from the base of skull to vertex.    COMPARISON: None.    FINDINGS: No intracranial hemorrhage is seen. The grey-white differentiation is maintained. Mild periventricular and subcortical white matter hypoattenuation without mass effect is noted, non-specific, but likely related to chronic small vessel ischemic changes.    Ventricles and sulci are normal in size and configuration for patient's age. No mass effect or midline shift is seen. Basal cisterns are not effaced.    Frontal secretions in the left sphenoid sinus. The tympanomastoid cavities are clear.    No osseous abnormality seen.    IMPRESSION: No intracranial hemorrhage or mass effect.    --- End of Report ---      Imaging  Reviewed:  YES/NO    Consultant(s) Notes Reviewed:   YES/ No      Plan of care was discussed with patient and /or primary care giver; all questions and concerns were addressed 
NP Note discussed with Primary Attending.    Patient is a 89y old  Female who presents with a chief complaint of lower extremity weakness (12 Oct 2021 10:16)      INTERVAL HPI/OVERNIGHT EVENTS: no new complaints    MEDICATIONS  (STANDING):  aspirin  chewable 81 milliGRAM(s) Oral daily  busPIRone 10 milliGRAM(s) Oral three times a day  enoxaparin Injectable 40 milliGRAM(s) SubCutaneous daily  hydrochlorothiazide 12.5 milliGRAM(s) Oral daily  levothyroxine 25 MICROGram(s) Oral daily  valsartan 320 milliGRAM(s) Oral daily    MEDICATIONS  (PRN):      __________________________________________________  REVIEW OF SYSTEMS:    CONSTITUTIONAL: No fever,   EYES: no acute visual disturbances  NECK: No pain or stiffness  RESPIRATORY: No cough; No shortness of breath  CARDIOVASCULAR: No chest pain, no palpitations  GASTROINTESTINAL: No pain. No nausea or vomiting; No diarrhea   NEUROLOGICAL: No headache or numbness, no tremors  MUSCULOSKELETAL: LE weakness, No joint pain, no muscle pain  GENITOURINARY: no dysuria, no frequency, no hesitancy  PSYCHIATRY: no depression , no anxiety  ALL OTHER  ROS negative        Vital Signs Last 24 Hrs  T(C): 36.4 (12 Oct 2021 05:36), Max: 36.8 (11 Oct 2021 15:33)  T(F): 97.5 (12 Oct 2021 05:36), Max: 98.3 (11 Oct 2021 15:33)  HR: 67 (12 Oct 2021 05:36) (67 - 92)  BP: 150/71 (12 Oct 2021 05:36) (130/84 - 150/71)  BP(mean): --  RR: 16 (12 Oct 2021 05:36) (16 - 18)  SpO2: 96% (12 Oct 2021 05:36) (92% - 96%)    ________________________________________________  PHYSICAL EXAM:  GENERAL: NAD  HEENT: Normocephalic;  conjunctivae and sclerae clear; moist mucous membranes;   NECK : supple  CHEST/LUNG: Clear to auscultation bilaterally with good air entry   HEART: S1 S2  regular; no murmurs, gallops or rubs  ABDOMEN: Soft, Nontender, Nondistended; Bowel sounds present  EXTREMITIES: no cyanosis; no edema; no calf tenderness  SKIN: warm and dry; no rash  NERVOUS SYSTEM:  Awake and alert; Oriented  to place, person and time ; no new deficits    _________________________________________________  LABS:                        13.6   5.16  )-----------( 219      ( 12 Oct 2021 08:21 )             41.2     10-12    136  |  100  |  13  ----------------------------<  85  3.9   |  32<H>  |  0.53    Ca    9.3      12 Oct 2021 08:21  Phos  3.4     10-12  Mg     2.0     10-    TPro  7.1  /  Alb  3.3<L>  /  TBili  0.9  /  DBili  x   /  AST  20  /  ALT  19  /  AlkPhos  91  10-11      Urinalysis Basic - ( 10 Oct 2021 21:00 )    Color: Yellow / Appearance: Clear / S.015 / pH: x  Gluc: x / Ketone: Negative  / Bili: Negative / Urobili: Negative   Blood: x / Protein: Negative / Nitrite: Negative   Leuk Esterase: Trace / RBC: Negative /HPF / WBC 3-5 /HPF   Sq Epi: x / Non Sq Epi: Few /HPF / Bacteria: Trace /HPF      CAPILLARY BLOOD GLUCOSE            RADIOLOGY & ADDITIONAL TESTS:      EXAM:  CT BRAIN                            PROCEDURE DATE:  10/10/2021          INTERPRETATION:  CLINICAL INFORMATION: Leg weakness    TECHNIQUE: Noncontrast axial CT images of the brain were acquired from the base of skull to vertex.    COMPARISON: None.    FINDINGS: No intracranial hemorrhage is seen. The grey-white differentiation is maintained. Mild periventricular and subcortical white matter hypoattenuation without mass effect is noted, non-specific, but likely related to chronic small vessel ischemic changes.    Ventricles and sulci are normal in size and configuration for patient's age. No mass effect or midline shift is seen. Basal cisterns are not effaced.    Frontal secretions in the left sphenoid sinus. The tympanomastoid cavities are clear.    No osseous abnormality seen.    IMPRESSION: No intracranial hemorrhage or mass effect.    --- End of Report ---    Imaging  Reviewed:  YES/NO    Consultant(s) Notes Reviewed:   YES/ No      Plan of care was discussed with patient and /or primary care giver; all questions and concerns were addressed 
Patient was seen and examined  Patient is a 89y old  Female who presents with a chief complaint of Lower extremity weakness (11 Oct 2021 19:47)      INTERVAL HPI/OVERNIGHT EVENTS:  T(C): 36.4 (10-12-21 @ 05:36), Max: 36.8 (10-11-21 @ 11:15)  HR: 67 (10-12-21 @ 05:36) (67 - 92)  BP: 150/71 (10-12-21 @ 05:36) (130/84 - 156/75)  RR: 16 (10-12-21 @ 05:36) (16 - 18)  SpO2: 96% (10-12-21 @ 05:36) (92% - 97%)  Wt(kg): --  I&O's Summary      LABS:                        13.6   5.16  )-----------( 219      ( 12 Oct 2021 08:21 )             41.2     10-12    136  |  100  |  13  ----------------------------<  85  3.9   |  32<H>  |  0.53    Ca    9.3      12 Oct 2021 08:21  Phos  3.4     10-12  Mg     2.0     10-    TPro  7.1  /  Alb  3.3<L>  /  TBili  0.9  /  DBili  x   /  AST  20  /  ALT  19  /  AlkPhos  91  10-11      Urinalysis Basic - ( 10 Oct 2021 21:00 )    Color: Yellow / Appearance: Clear / S.015 / pH: x  Gluc: x / Ketone: Negative  / Bili: Negative / Urobili: Negative   Blood: x / Protein: Negative / Nitrite: Negative   Leuk Esterase: Trace / RBC: Negative /HPF / WBC 3-5 /HPF   Sq Epi: x / Non Sq Epi: Few /HPF / Bacteria: Trace /HPF      CAPILLARY BLOOD GLUCOSE        LIPID PANEL  Cholesterol 179  LDL --  HDL 77  RATIO HDL/Total Cholesterol --  Triglyceride 74        Urinalysis Basic - ( 10 Oct 2021 21:00 )    Color: Yellow / Appearance: Clear / S.015 / pH: x  Gluc: x / Ketone: Negative  / Bili: Negative / Urobili: Negative   Blood: x / Protein: Negative / Nitrite: Negative   Leuk Esterase: Trace / RBC: Negative /HPF / WBC 3-5 /HPF   Sq Epi: x / Non Sq Epi: Few /HPF / Bacteria: Trace /HPF        MEDICATIONS  (STANDING):  aspirin  chewable 81 milliGRAM(s) Oral daily  busPIRone 10 milliGRAM(s) Oral three times a day  enoxaparin Injectable 40 milliGRAM(s) SubCutaneous daily  hydrochlorothiazide 12.5 milliGRAM(s) Oral daily  levothyroxine 25 MICROGram(s) Oral daily  valsartan 320 milliGRAM(s) Oral daily    MEDICATIONS  (PRN):      RADIOLOGY & ADDITIONAL TESTS:    Imaging Personally Reviewed:  [ ] YES  [ ] NO    REVIEW OF SYSTEMS:  CONSTITUTIONAL: No fever, weight loss, or fatigue  EYES: No eye pain, visual disturbances, or discharge  ENMT:  No difficulty hearing, tinnitus, vertigo; No sinus or throat pain  NECK: No pain or stiffness  BREASTS: No pain, masses, or nipple discharge  RESPIRATORY: No cough, wheezing, chills or hemoptysis; No shortness of breath  CARDIOVASCULAR: No chest pain, palpitations, dizziness, or leg swelling  GASTROINTESTINAL: No abdominal or epigastric pain. No nausea, vomiting, or hematemesis; No diarrhea or constipation. No melena or hematochezia.  GENITOURINARY: No dysuria, frequency, hematuria, or incontinence  NEUROLOGICAL: No headaches, memory loss, loss of strength, numbness, or tremors  SKIN: No itching, burning, rashes, or lesions   LYMPH NODES: No enlarged glands  ENDOCRINE: No heat or cold intolerance; No hair loss  MUSCULOSKELETAL: No joint pain or swelling; No muscle, back, or extremity pain  PSYCHIATRIC: No depression, anxiety, mood swings, or difficulty sleeping  HEME/LYMPH: No easy bruising, or bleeding gums  ALLERY AND IMMUNOLOGIC: No hives or eczema      Consultant(s) Notes Reviewed:  [ x ] YES  [ ] NO    PHYSICAL EXAM:  GENERAL: NAD, well-groomed, well-developed  HEAD:  Atraumatic, Normocephalic  EYES: EOMI, PERRLA, conjunctiva and sclera clear  ENMT: No tonsillar erythema, exudates, or enlargement; Moist mucous membranes, Good dentition, No lesions  NECK: Supple, No JVD, Normal thyroid  NERVOUS SYSTEM:  Alert & Oriented X3, Good concentration; Motor Strength 5/5 B/L upper and lower extremities; DTRs 2+ intact and symmetric  CHEST/LUNG: Clear to percussion bilaterally; No rales, rhonchi, wheezing, or rubs  HEART: Regular rate and rhythm; No murmurs, rubs, or gallops  ABDOMEN: Soft, Nontender, Nondistended; Bowel sounds present  EXTREMITIES:  2+ Peripheral Pulses, No clubbing, cyanosis, or edema  LYMPH: No lymphadenopathy noted  SKIN: No rashes or lesions    Care Discussed with Consultants/Other Providers [ x] YES  [ ] NO
Patient was seen and examined  Patient is a 89y old  Female who presents with a chief complaint of lower extremity weakness (14 Oct 2021 18:25)      INTERVAL HPI/OVERNIGHT EVENTS:  T(C): 36.3 (10-15-21 @ 05:08), Max: 36.6 (10-14-21 @ 14:15)  HR: 76 (10-15-21 @ 05:08) (76 - 96)  BP: 162/75 (10-15-21 @ 05:08) (113/73 - 162/75)  RR: 17 (10-15-21 @ 05:08) (16 - 17)  SpO2: 95% (10-15-21 @ 05:08) (95% - 98%)  Wt(kg): --  I&O's Summary      LABS:              CAPILLARY BLOOD GLUCOSE                  MEDICATIONS  (STANDING):  aspirin  chewable 81 milliGRAM(s) Oral daily  busPIRone 10 milliGRAM(s) Oral three times a day  enoxaparin Injectable 40 milliGRAM(s) SubCutaneous daily  hydrochlorothiazide 12.5 milliGRAM(s) Oral daily  levothyroxine 25 MICROGram(s) Oral daily  pantoprazole    Tablet 40 milliGRAM(s) Oral before breakfast  valsartan 320 milliGRAM(s) Oral daily    MEDICATIONS  (PRN):      RADIOLOGY & ADDITIONAL TESTS:    Imaging Personally Reviewed:  [ ] YES  [ ] NO    REVIEW OF SYSTEMS:  CONSTITUTIONAL: No fever, weight loss, or fatigue  EYES: No eye pain, visual disturbances, or discharge  ENMT:  No difficulty hearing, tinnitus, vertigo; No sinus or throat pain  NECK: No pain or stiffness  BREASTS: No pain, masses, or nipple discharge  RESPIRATORY: No cough, wheezing, chills or hemoptysis; No shortness of breath  CARDIOVASCULAR: No chest pain, palpitations, dizziness, or leg swelling  GASTROINTESTINAL: No abdominal or epigastric pain. No nausea, vomiting, or hematemesis; No diarrhea or constipation. No melena or hematochezia.  GENITOURINARY: No dysuria, frequency, hematuria, or incontinence  NEUROLOGICAL: No headaches, memory loss, loss of strength, numbness, or tremors  SKIN: No itching, burning, rashes, or lesions   LYMPH NODES: No enlarged glands  ENDOCRINE: No heat or cold intolerance; No hair loss  MUSCULOSKELETAL: No joint pain or swelling; No muscle, back, or extremity pain  PSYCHIATRIC: No depression, anxiety, mood swings, or difficulty sleeping  HEME/LYMPH: No easy bruising, or bleeding gums  ALLERY AND IMMUNOLOGIC: No hives or eczema      Consultant(s) Notes Reviewed:  [ x ] YES  [ ] NO    PHYSICAL EXAM:  GENERAL: NAD, well-groomed, well-developed  HEAD:  Atraumatic, Normocephalic  EYES: EOMI, PERRLA, conjunctiva and sclera clear  ENMT: No tonsillar erythema, exudates, or enlargement; Moist mucous membranes, Good dentition, No lesions  NECK: Supple, No JVD, Normal thyroid  NERVOUS SYSTEM:  Alert & Oriented X3, Good concentration; Motor Strength 5/5 B/L upper and lower extremities; DTRs 2+ intact and symmetric  CHEST/LUNG: Clear to percussion bilaterally; No rales, rhonchi, wheezing, or rubs  HEART: Regular rate and rhythm; No murmurs, rubs, or gallops  ABDOMEN: Soft, Nontender, Nondistended; Bowel sounds present  EXTREMITIES:  2+ Peripheral Pulses, No clubbing, cyanosis, or edema  LYMPH: No lymphadenopathy noted  SKIN: No rashes or lesions    Care Discussed with Consultants/Other Providers [ x] YES  [ ] NO

## 2021-10-15 NOTE — DISCHARGE NOTE NURSING/CASE MANAGEMENT/SOCIAL WORK - PATIENT PORTAL LINK FT
You can access the FollowMyHealth Patient Portal offered by [facility] by registering at the following website: [email or web address]. By joining [employer]’s FollowMyHealth portal, you will also be able to view your health information using other applications (apps) compatible with our system.

## 2021-10-15 NOTE — PROGRESS NOTE ADULT - PROBLEM SELECTOR PLAN 4
Hx of anxiety in the past  home med - Buspirone 10 mg TID  currently denies any episodes of anxiety  continue home med.
Hx of anxiety in the past  home med - Buspirone 10 mg TID  currently denies any episodes of anxiety  continue home med.
c/w Buspirone
Hx of anxiety in the past  home med - Buspirone 10 mg TID  currently denies any episodes of anxiety  continue home med
c/w Buspirone
Hx of anxiety in the past  home med - Buspirone 10 mg TID  currently denies any episodes of anxiety  continue home med

## 2022-11-02 NOTE — ED ADULT NURSE NOTE - EXTENSIONS OF SELF_ADULT
Referral placed for bariatrics  Discussed with patient diet and need for daily exercise with goal of 150 minutes/week  Encourage weight training exercises  Sleep study ordered None

## 2023-10-24 NOTE — ED ADULT NURSE NOTE - ISOLATION TYPE:
Medicare Wellness Visit  Plan for Preventive Care    A good way for you to stay healthy is to use preventive care. Medicare covers many services that can help you stay healthy. * The goal of these services is to find any health problems as quickly as possible. Finding problems early can help make them easier to treat. Your personal plan below lists the services you may need and when they are due. Health Maintenance Summary     Shingles Vaccine (1 of 2)  Overdue - never done    DTaP/Tdap/Td Vaccine (2 - Td or Tdap)  Overdue since 4/19/2022    Medicare Advantage- Medicare Wellness Visit (Yearly - January to December)  Overdue since 1/1/2023    DM/CKD Microalbumin (Yearly)  Due since 10/20/2023    Depression Screening (Yearly)  Next due on 5/8/2024    DM/CKD GFR (Yearly)  Ordered on 10/24/2023    Pneumococcal Vaccine 65+   Completed    Influenza Vaccine   Completed    COVID-19 Vaccine   Completed    Meningococcal Vaccine   Aged Out    Hepatitis B Vaccine (For Physician/[facility] Discussion)   Aged Out    HPV Vaccine   Aged Out           Preventive Care for Women and Men    Heart Screenings (Cardiovascular):  Â· Blood tests are used to check your cholesterol, lipid and triglyceride levels. High levels can increase your risk for heart disease and stroke. High levels can be treated with medications, diet and exercise. Lowering your levels can help keep your heart and blood vessels healthy. Your provider will order these tests if they are needed. Â· An ultrasound is done to see if you have an abdominal aortic aneurysm (AAA). This is an enlargement of one of the main blood vessels that delivers blood to the body. In the [facility], 9,000 deaths are caused by AAA. You may not even know you have this problem and as many as 1 in 3 people will have a serious problem if it is not treated. Early diagnosis allows for more effective treatment and cure.   If you have a family history of AAA or are a male age 70-76 who has smoked, you are at higher risk of an AAA. Your provider can order this test, if needed. Colorectal Screening:  Â· There are many tests that are used to check for cancer of your colon and rectum. You and your provider should discuss what test is best for you and when to have it done. Options include:  Â· Screening Colonoscopy: exam of the entire colon, seen through a flexible lighted tube. Â· Flexible Sigmoidoscopy: exam of the last third (sigmoid portion) of the colon and rectum, seen through a flexible lighted tube. Â· Cologuard DNA stool test: a sample of your stool is used to screen for cancer and unseen blood in your stool. Â· Fecal Occult Blood Test: a sample of your stool is studied to find any unseen blood    Flu Shot:  Â· An immunization that helps to prevent influenza (the flu). You should get this every year. The best time to get the shot is in the fall. Pneumococcal Shot:  â¢ Vaccines help prevent pneumococcal disease, which is any type of illness caused by Streptococcus pneumoniae bacteria. There are two kinds of pneumococcal vaccines available in the [place]:   o Pneumococcal conjugate vaccines (PCV20 or Ktdralc84Â®)  o Pneumococcal polysaccharide vaccine (PPSV23 or [id number]®)  Â· For those who have never received any pneumococcal conjugate vaccine, CDC recommends PVC20 for adults 72 years or older and adults 23 through 59years old with certain medical conditions or risk factors. Â· For those who have previously received PCV13, this should be followed by a dose of PPSV23. Hepatitis B Shot:  Â· An immunization that helps to protect people from getting Hepatitis B. Hepatitis B is a virus that spreads through contact with infected blood or body fluids. Many people with the virus do not have symptoms. The virus can lead to serious problems, such as liver disease. Some people are at higher risk than others. Your doctor will tell you if you need this shot.      Diabetes Screening:  Â· A test to measure sugar (glucose) in your blood is called a fasting blood sugar. Fasting means you cannot have food or drink for at least 8 hours before the test. This test can detect diabetes long before you may notice symptoms. Glaucoma Screening:  Â· Glaucoma screening is performed by your eye doctor. The test measures the fluid pressure inside your eyes to determine if you have glaucoma. Hepatitis C Screening:  Â· A blood test to see if you have the hepatitis C virus. Hepatitis C attacks the liver and is a major cause of chronic liver disease. Medicare will cover a single screening for all adults born between [facility], or high risk patients (people who have injected illegal drugs or people who have had blood transfusions). High risk patients who continue to inject illegal drugs can be screened for Hepatitis C every year. Smoking and Tobacco-Use Cessation Counseling:  Â· Tobacco is the single greatest cause of disease and early death in our country today. Medication and counseling together can increase a personâs chance of quitting for good. Â· Medicare covers two quitting attempts per year, with four counseling sessions per attempt (eight sessions in a 12 month period)    Preventive Screening tests for Women    Screening Mammograms and Breast Exams:  Â· An x-ray of your breasts to check for breast cancer before you or your doctor may be able to feel it. If breast cancer is found early it can usually be treated with success. Pelvic Exams and Pap Tests:  Â· An exam to check for cervical and vaginal cancer. A Pap test is a lab test in which cells are taken from your cervix and sent to the lab to look for signs of cervical cancer. If cancer of the cervix is found early, chances for a cure are good. Testing can generally end at age 72, or if a woman has a hysterectomy for a benign condition. Your provider may recommend more frequent testing if certain abnormal results are found.     Bone Mass Measurements:  Â· A painless x-ray of your bone density to see if you are at risk for a broken bone. Bone density refers to the thickness of bones or how tightly the bone tissue is packed. Preventive Screening tests for Men    Prostate Screening:  Â· Should you have a prostate cancer test (PSA)? It is up to you to decide if you want a prostate cancer test. Talk to your clinician to find out if the test is right for you. Things for you to consider and talk about should include:  Â· Benefits and harms of the test  Â· Your family history  Â· How your race/ethnicity may influence the test  Â· If the test may impact other medical conditions you have  Â· Your values on screenings and treatments    *Medicare pays for many preventive services to keep you healthy. For some of these services, you might have to pay a deductible, coinsurance, and / or copayment. The amounts vary depending on the type of services you need and the kind of Medicare health plan you have.     For further details on screenings offered by Medicare please visit: [email or web address] None